# Patient Record
Sex: FEMALE | Race: BLACK OR AFRICAN AMERICAN | Employment: FULL TIME | URBAN - METROPOLITAN AREA
[De-identification: names, ages, dates, MRNs, and addresses within clinical notes are randomized per-mention and may not be internally consistent; named-entity substitution may affect disease eponyms.]

---

## 2017-04-12 ENCOUNTER — HOSPITAL ENCOUNTER (EMERGENCY)
Age: 27
Discharge: HOME | End: 2017-04-12
Attending: EMERGENCY MEDICINE
Payer: MEDICAID

## 2017-04-12 VITALS
SYSTOLIC BLOOD PRESSURE: 118 MMHG | WEIGHT: 165 LBS | TEMPERATURE: 97.9 F | HEART RATE: 100 BPM | DIASTOLIC BLOOD PRESSURE: 80 MMHG | HEIGHT: 68 IN | RESPIRATION RATE: 18 BRPM | BODY MASS INDEX: 25.01 KG/M2 | OXYGEN SATURATION: 100 %

## 2017-04-12 DIAGNOSIS — R53.83 FATIGUE, UNSPECIFIED TYPE: Primary | ICD-10-CM

## 2017-04-12 LAB
ANION GAP BLD CALC-SCNC: 19 MMOL/L (ref 5–15)
BUN BLD-MCNC: 6 MG/DL (ref 9–20)
CA-I BLD-MCNC: 1.25 MMOL/L (ref 1.12–1.32)
CHLORIDE BLD-SCNC: 104 MMOL/L (ref 98–107)
CO2 BLD-SCNC: 21 MMOL/L (ref 21–32)
CREAT BLD-MCNC: 0.7 MG/DL (ref 0.6–1.3)
GLUCOSE BLD-MCNC: 83 MG/DL (ref 65–100)
HCT VFR BLD CALC: 45 % (ref 35–47)
HGB BLD-MCNC: 15.3 GM/DL (ref 11.5–16)
POTASSIUM BLD-SCNC: 3.7 MMOL/L (ref 3.5–5.1)
SERVICE CMNT-IMP: ABNORMAL
SODIUM BLD-SCNC: 140 MMOL/L (ref 136–145)

## 2017-04-12 PROCEDURE — 99283 EMERGENCY DEPT VISIT LOW MDM: CPT

## 2017-04-12 PROCEDURE — 80047 BASIC METABLC PNL IONIZED CA: CPT

## 2017-04-12 NOTE — LETTER
[facility] EMERGENCY DEPT 
[address] [facility] [address] [id number] 
[phone] Work/School Note Date: 4/12/2017 To Whom It May concern: 
 
Asael Guadarrama was seen and treated today in the emergency room by the following provider(s): 
Attending Provider: Chintan Graham MD 
Physician Assistant: Thayer Ahumada, PA-C. Asael Guadarrama may return to work on 4/13/17. Sincerely, Thayer Ahumada, PA-C

## 2017-04-12 NOTE — DISCHARGE INSTRUCTIONS
Fatigue: Care Instructions  Your Care Instructions  Fatigue is a feeling of tiredness, exhaustion, or lack of energy. You may feel fatigue because of too much or not enough activity. It can also come from stress, lack of sleep, boredom, and poor diet. Many medical problems, such as viral infections, can cause fatigue. Emotional problems, especially depression, are often the cause of fatigue. Fatigue is most often a symptom of another problem. Treatment for fatigue depends on the cause. For example, if you have fatigue because you have a certain health problem, treating this problem also treats your fatigue. If depression or anxiety is the cause, treatment may help. Follow-up care is a key part of your treatment and safety. Be sure to make and go to all appointments, and call your doctor if you are having problems. It's also a good idea to know your test results and keep a list of the medicines you take. How can you care for yourself at home? · Get regular exercise. But don't overdo it. Go back and forth between rest and exercise. · Get plenty of rest.  · Eat a healthy diet. Do not skip meals, especially breakfast.  · Reduce your use of caffeine, tobacco, and alcohol. Caffeine is most often found in coffee, tea, cola drinks, and chocolate. · Limit medicines that can cause fatigue. This includes tranquilizers and cold and allergy medicines. When should you call for help? Watch closely for changes in your health, and be sure to contact your doctor if:  · You have new symptoms such as fever or a rash. · Your fatigue gets worse. · You have been feeling down, depressed, or hopeless. Or you may have lost interest in things that you usually enjoy. · You are not getting better as expected. Where can you learn more? Go to http://rito-freddy.info/. Enter V753 in the search box to learn more about \"Fatigue: Care Instructions. \"  Current as of: May 27, 2016  Content Version: 11.2  © 2223-5517 [employer]. Care instructions adapted under license by [employer] (which disclaims liability or warranty for this information). If you have questions about a medical condition or this instruction, always ask your healthcare professional. [place] [address] any warranty or liability for your use of this information.

## 2017-04-12 NOTE — ED NOTES
Pt c/o allergies sx and lower back pain, was seen and treated and still feeling drained. Pt unable to see PCP. Emergency Department Nursing Plan of Care       The Nursing Plan of Care is developed from the Nursing assessment and Emergency Department Attending provider initial evaluation. The plan of care may be reviewed in the ED Provider note.     The Plan of Care was developed with the following considerations:   Patient / Family readiness to learn indicated by:verbalized understanding  Persons(s) to be included in education: patient  Barriers to Learning/Limitations:No    Signed     Chitra Huerta RN    4/12/2017   9:15 AM

## 2017-04-12 NOTE — ED PROVIDER NOTES
HPI Comments: Pt states she went to PT first last week and was treated for UTI and allergies. Pt states she went to work this weekend but couldn't make it to work yesterday because she just felt \"weak\"  Pt denies any specific pain but reports she is unsure if it's the medicine she is taking or if she needs to take a \"multi vitamin\"  Pt states she was told to come back to PT first but she lived closer to this ED and she was unable to get an appt with PCP. Patient is a 27 y.o. female presenting with fatigue. The history is provided by the patient. Fatigue   This is a new problem. The current episode started 2 days ago. The problem has not changed since onset. There was no focality noted. Pertinent negatives include no slurred speech, no speech difficulty, no memory loss, no mental status change and no disorientation. There has been no fever. Pertinent negatives include no shortness of breath, no chest pain, no vomiting, no headaches and no nausea. History reviewed. No pertinent past medical history. History reviewed. No pertinent surgical history. History reviewed. No pertinent family history. Social History     Social History    Marital status: SINGLE     Spouse name: N/A    Number of children: N/A    Years of education: N/A     Occupational History    Not on file. Social History Main Topics    Smoking status: Current Every Day Smoker     Packs/day: 0.50    Smokeless tobacco: Not on file    Alcohol use Yes    Drug use: No    Sexual activity: No     Other Topics Concern    Not on file     Social History Narrative         ALLERGIES: Review of patient's allergies indicates no known allergies. Review of Systems   Constitutional: Positive for activity change and fatigue. Negative for chills and fever. Respiratory: Negative for shortness of breath. Cardiovascular: Negative for chest pain. Gastrointestinal: Negative for nausea and vomiting.    Genitourinary: Negative for dysuria and frequency. Musculoskeletal: Positive for back pain. Neurological: Negative for dizziness, speech difficulty, weakness and headaches. Psychiatric/Behavioral: Negative for memory loss. All other systems reviewed and are negative. Vitals:    04/12/17 0853   BP: 118/80   Pulse: 100   Resp: 18   Temp: 97.9 °F (36.6 °C)   SpO2: 100%   Weight: 74.8 kg (165 lb)   Height: 5' 8\" (1.727 m)            Physical Exam   Constitutional: She is oriented to person, place, and time. She appears well-developed and well-nourished. No distress. HENT:   Head: Normocephalic and atraumatic. Mouth/Throat: Oropharynx is clear and moist. No oropharyngeal exudate. Eyes: Conjunctivae are normal.   Cardiovascular: Normal rate, regular rhythm and normal heart sounds. Pulmonary/Chest: Effort normal and breath sounds normal. No respiratory distress. She has no wheezes. She has no rales. Abdominal: Soft. Bowel sounds are normal.   Musculoskeletal: Normal range of motion. Neurological: She is alert and oriented to person, place, and time. Skin: Skin is warm and dry. Psychiatric: She has a normal mood and affect. Her behavior is normal. Judgment and thought content normal.   Nursing note and vitals reviewed.        MDM  Number of Diagnoses or Management Options  Diagnosis management comments: DDX: electrolyte imbalance, anemia, viral illness       Amount and/or Complexity of Data Reviewed  Clinical lab tests: ordered and reviewed      ED Course       Procedures

## 2017-07-10 ENCOUNTER — HOSPITAL ENCOUNTER (EMERGENCY)
Age: 27
Discharge: HOME | End: 2017-07-10
Attending: INTERNAL MEDICINE
Payer: MEDICAID

## 2017-07-10 VITALS
DIASTOLIC BLOOD PRESSURE: 74 MMHG | BODY MASS INDEX: 28.49 KG/M2 | RESPIRATION RATE: 18 BRPM | SYSTOLIC BLOOD PRESSURE: 124 MMHG | TEMPERATURE: 98.4 F | HEART RATE: 101 BPM | HEIGHT: 68 IN | WEIGHT: 188 LBS | OXYGEN SATURATION: 98 %

## 2017-07-10 DIAGNOSIS — B96.89 BV (BACTERIAL VAGINOSIS): Primary | ICD-10-CM

## 2017-07-10 DIAGNOSIS — N76.0 BV (BACTERIAL VAGINOSIS): Primary | ICD-10-CM

## 2017-07-10 DIAGNOSIS — F41.8 ANXIETY ASSOCIATED WITH DEPRESSION: ICD-10-CM

## 2017-07-10 LAB
APPEARANCE UR: ABNORMAL
BACTERIA URNS QL MICRO: ABNORMAL /HPF
BILIRUB UR QL: NEGATIVE
COLOR UR: ABNORMAL
EPITH CASTS URNS QL MICRO: ABNORMAL /LPF
GLUCOSE UR STRIP.AUTO-MCNC: NEGATIVE MG/DL
HCG UR QL: NEGATIVE
HGB UR QL STRIP: NEGATIVE
KETONES UR QL STRIP.AUTO: NEGATIVE MG/DL
LEUKOCYTE ESTERASE UR QL STRIP.AUTO: ABNORMAL
NITRITE UR QL STRIP.AUTO: NEGATIVE
PH UR STRIP: 6 [PH] (ref 5–8)
PROT UR STRIP-MCNC: NEGATIVE MG/DL
RBC #/AREA URNS HPF: ABNORMAL /HPF (ref 0–5)
SP GR UR REFRACTOMETRY: 1.01 (ref 1–1.03)
UA: UC IF INDICATED,UAUC: ABNORMAL
UROBILINOGEN UR QL STRIP.AUTO: 0.2 EU/DL (ref 0.2–1)
WBC URNS QL MICRO: ABNORMAL /HPF (ref 0–4)

## 2017-07-10 PROCEDURE — 99284 EMERGENCY DEPT VISIT MOD MDM: CPT

## 2017-07-10 PROCEDURE — 81001 URINALYSIS AUTO W/SCOPE: CPT | Performed by: EMERGENCY MEDICINE

## 2017-07-10 PROCEDURE — 87086 URINE CULTURE/COLONY COUNT: CPT | Performed by: EMERGENCY MEDICINE

## 2017-07-10 PROCEDURE — 81025 URINE PREGNANCY TEST: CPT

## 2017-07-10 RX ORDER — SERTRALINE HYDROCHLORIDE 25 MG/1
25 TABLET, FILM COATED ORAL DAILY
Qty: 30 TAB | Refills: 0 | Status: SHIPPED | OUTPATIENT
Start: 2017-07-10 | End: 2021-06-04 | Stop reason: DRUGHIGH

## 2017-07-10 RX ORDER — METRONIDAZOLE 500 MG/1
500 TABLET ORAL 2 TIMES DAILY
Qty: 14 TAB | Refills: 0 | Status: SHIPPED | OUTPATIENT
Start: 2017-07-10 | End: 2017-07-17

## 2017-07-10 RX ORDER — LORAZEPAM 0.5 MG/1
0.5 TABLET ORAL
Qty: 20 TAB | Refills: 0 | Status: SHIPPED | OUTPATIENT
Start: 2017-07-10 | End: 2021-06-04

## 2017-07-10 NOTE — ED NOTES
Discharge instructions were given to the patient by Alfonso Florian RN. Patient was given 3 prescriptions and was encouraged to call or return to the ED for worsening issues or problems and was encouraged to schedule a follow up appointment for continuing care. Patient given a current medication reconciliation form and verbalized understanding of their medications and importance of discussing medications at follow-up. The patient verbalized understanding of discharge instructions and prescriptions, all questions were answered. The patient has no further concerns at this time. Patient stable at time of discharge. The patient left the Emergency Department ambulatory, with self, and in no acute distress. Patient declined wheelchair transport out of Emergency Department.

## 2017-07-10 NOTE — ED NOTES
Emergency Department Nursing Plan of Care       The Nursing Plan of Care is developed from the Nursing assessment and Emergency Department Attending provider initial evaluation. The plan of care may be reviewed in the ED Provider note.     The Plan of Care was developed with the following considerations:   Patient / Family readiness to learn indicated by:verbalized understanding  Persons(s) to be included in education: patient  Barriers to Learning/Limitations:No    Signed     Zaina Dykes RN    7/10/2017   3:21 PM

## 2017-07-10 NOTE — ED PROVIDER NOTES
HPI Comments: Yesika Quigley is a 32 y.o. female presenting ambulatory to the ED with c/o recurrent \"white\" vaginal discharge x 1 week. Pt notes associated symptom of lower abdominal pain. She states she has had similar symptoms in the past and was diagnosed with bacterial vaginosis. Pt believes her symptoms may be secondary to the soap she uses. Pt is unable to schedule an appointment with her OB/GYN until the end of the month. Pt denies fever, chills, nausea, vomiting, hx of having a STD, being sexually active or chance of being pregnant. Yesika Quigley is a 32 y.o. female with significant PMHx of anxiety, presents ambulatory to the ED with c/o anxious x 2 months. She notes that she has not been sleeping properly. Pt reports she has been very stressed because her brother passed away and they do not his cause of death. She states her  is in the process of scheduling an appointment with a psychiatrist for her. Pt notes she smokes 1 pack a day and has been drinking alcohol more frequently. Pt denies suicidal/homicidal ideations. PCP: None    There are no other complaints, changes or physical findings at this time. Written by SHAYE Nesbitt, as dictated by Suha Martines MD.      The history is provided by the patient. No  was used. History reviewed. No pertinent past medical history. History reviewed. No pertinent surgical history. History reviewed. No pertinent family history. Social History     Social History    Marital status: SINGLE     Spouse name: N/A    Number of children: N/A    Years of education: N/A     Occupational History    Not on file.      Social History Main Topics    Smoking status: Current Every Day Smoker     Packs/day: 1.00    Smokeless tobacco: Never Used    Alcohol use Yes      Comment: \"every other day\"    Drug use: Yes     Special: Marijuana      Comment: occ    Sexual activity: No     Other Topics Concern    Not on file     Social History Narrative         ALLERGIES: Review of patient's allergies indicates no known allergies. Review of Systems   Constitutional: Negative. Negative for activity change, appetite change, chills, fatigue, fever and unexpected weight change. HENT: Negative. Negative for congestion, hearing loss, rhinorrhea, sneezing and voice change. Eyes: Negative. Negative for pain and visual disturbance. Respiratory: Negative. Negative for apnea, cough, choking, chest tightness and shortness of breath. Cardiovascular: Negative. Negative for chest pain and palpitations. Gastrointestinal: Positive for abdominal pain (lower). Negative for abdominal distention, blood in stool, diarrhea, nausea and vomiting. Genitourinary: Positive for vaginal discharge (\"white\"). Negative for difficulty urinating, flank pain, frequency and urgency. No discharge   Musculoskeletal: Negative. Negative for arthralgias, back pain, myalgias and neck stiffness. Skin: Negative. Negative for color change and rash. Neurological: Negative. Negative for dizziness, seizures, syncope, speech difficulty, weakness, numbness and headaches. Hematological: Negative for adenopathy. Psychiatric/Behavioral: Positive for sleep disturbance. Negative for agitation, behavioral problems, dysphoric mood and suicidal ideas. The patient is nervous/anxious. Vitals:    07/10/17 1458 07/10/17 1635   BP: 122/70 124/74   Pulse: (!) 114 (!) 101   Resp: 18 18   Temp: 98.4 °F (36.9 °C)    SpO2: 97% 98%   Weight: 85.3 kg (188 lb)    Height: 5' 8\" (1.727 m)             Physical Exam   Constitutional: She is oriented to person, place, and time. She appears well-developed and well-nourished. HENT:   Head: Normocephalic and atraumatic. Mouth/Throat: Oropharynx is clear and moist.   Eyes: Conjunctivae and EOM are normal. Pupils are equal, round, and reactive to light. Neck: Normal range of motion. Neck supple. Cardiovascular: Normal rate, regular rhythm and normal heart sounds. Exam reveals no gallop and no friction rub. No murmur heard. Pulmonary/Chest: Effort normal and breath sounds normal. No respiratory distress. She has no wheezes. She has no rales. Abdominal: Soft. Bowel sounds are normal. She exhibits no distension. There is no tenderness. There is no rebound and no guarding. Musculoskeletal: Normal range of motion. She exhibits no edema or tenderness. Lymphadenopathy:     She has no cervical adenopathy. Neurological: She is alert and oriented to person, place, and time. She has normal strength. No cranial nerve deficit or sensory deficit. She displays a negative Romberg sign. Coordination and gait normal.   Skin: Skin is warm and dry. No ecchymosis, no lesion and no rash noted. Rash is not urticarial. She is not diaphoretic. No erythema. Psychiatric: She has a normal mood and affect. Nursing note and vitals reviewed.        MDM  Number of Diagnoses or Management Options  Diagnosis management comments:     DDx: BV, UTI, pregnancy, stress induced anxiety/depression        Amount and/or Complexity of Data Reviewed  Clinical lab tests: ordered and reviewed    Patient Progress  Patient progress: stable    ED Course       Procedures    LABORATORY TESTS:  Recent Results (from the past 12 hour(s))   URINALYSIS W/ REFLEX CULTURE    Collection Time: 07/10/17  3:38 PM   Result Value Ref Range    Color YELLOW/STRAW      Appearance CLOUDY (A) CLEAR      Specific gravity 1.010 1.003 - 1.030      pH (UA) 6.0 5.0 - 8.0      Protein NEGATIVE  NEG mg/dL    Glucose NEGATIVE  NEG mg/dL    Ketone NEGATIVE  NEG mg/dL    Bilirubin NEGATIVE  NEG      Blood NEGATIVE  NEG      Urobilinogen 0.2 0.2 - 1.0 EU/dL    Nitrites NEGATIVE  NEG      Leukocyte Esterase TRACE (A) NEG      WBC 0-4 0 - 4 /hpf    RBC 0-5 0 - 5 /hpf    Epithelial cells FEW FEW /lpf    Bacteria 1+ (A) NEG /hpf    UA:UC IF INDICATED URINE CULTURE ORDERED (A) CNI     HCG URINE, QL. - POC    Collection Time: 07/10/17  3:38 PM   Result Value Ref Range    Pregnancy test,urine (POC) NEGATIVE  NEG         IMPRESSION:  1. BV (bacterial vaginosis)    2. Anxiety associated with depression        PLAN:  1. Discharge Medication List as of 7/10/2017  4:19 PM      START taking these medications    Details   LORazepam (ATIVAN) 0.5 mg tablet Take 1 Tab by mouth every eight (8) hours as needed for Anxiety. Max Daily Amount: 1.5 mg., Print, Disp-20 Tab, R-0      sertraline (ZOLOFT) 25 mg tablet Take 1 Tab by mouth daily. , Print, Disp-30 Tab, R-0      metroNIDAZOLE (FLAGYL) 500 mg tablet Take 1 Tab by mouth two (2) times a day for 7 days. , Print, Disp-14 Tab, R-0           2. Follow-up Information     Follow up With Details Comments Contact Info    None In 3 days  None (395) Patient stated that they have no PCP      [facility] EMERGENCY DEPT   [facility]  [phone]        Return to ED if worse     DISCHARGE NOTE  4:35 PM  The patient has been re-evaluated and is ready for discharge. Reviewed available results with patient. Counseled pt on diagnosis and care plan. Pt has expressed understanding, and all questions have been answered. Pt agrees with plan and agrees to F/U as recommended, or return to the ED if their sxs worsen. Discharge instructions have been provided and explained to the pt, along with reasons to return to the ED. Written by Amara Serrato, ED Scribe, as dictated by Cassius Cogan, MD.      This note is prepared by Amara Serrato, acting as Scribe for Cassius Cogan, MD.    Cassius Cogan, MD : The scribe's documentation has been prepared under my direction and personally reviewed by me in its entirety. I confirm that the note above accurately reflects all work, treatment, procedures, and medical decision making performed by me.

## 2017-07-10 NOTE — DISCHARGE INSTRUCTIONS
Anxiety Disorder: Care Instructions  Your Care Instructions  Anxiety is a normal reaction to stress. Difficult situations can cause you to have symptoms such as sweaty palms and a nervous feeling. In an anxiety disorder, the symptoms are far more severe. Constant worry, muscle tension, trouble sleeping, nausea and diarrhea, and other symptoms can make normal daily activities difficult or impossible. These symptoms may occur for no reason, and they can affect your work, school, or social life. Medicines, counseling, and self-care can all help. Follow-up care is a key part of your treatment and safety. Be sure to make and go to all appointments, and call your doctor if you are having problems. It's also a good idea to know your test results and keep a list of the medicines you take. How can you care for yourself at home? · Take medicines exactly as directed. Call your doctor if you think you are having a problem with your medicine. · Go to your counseling sessions and follow-up appointments. · Recognize and accept your anxiety. Then, when you are in a situation that makes you anxious, say to yourself, \"This is not an emergency. I feel uncomfortable, but I am not in danger. I can keep going even if I feel anxious. \"  · Be kind to your body:  ¨ Relieve tension with exercise or a massage. ¨ Get enough rest.  ¨ Avoid alcohol, caffeine, nicotine, and illegal drugs. They can increase your anxiety level and cause sleep problems. ¨ Learn and do relaxation techniques. See below for more about these techniques. · Engage your mind. Get out and do something you enjoy. Go to a funny movie, or take a walk or hike. Plan your day. Having too much or too little to do can make you anxious. · Keep a record of your symptoms. Discuss your fears with a good friend or family member, or join a support group for people with similar problems. Talking to others sometimes relieves stress.   · Get involved in social groups, or volunteer to help others. Being alone sometimes makes things seem worse than they are. · Get at least 30 minutes of exercise on most days of the week to relieve stress. Walking is a good choice. You also may want to do other activities, such as running, swimming, cycling, or playing tennis or team sports. Relaxation techniques  Do relaxation exercises 10 to 20 minutes a day. You can play soothing, relaxing music while you do them, if you wish. · Tell others in your house that you are going to do your relaxation exercises. Ask them not to disturb you. · Find a comfortable place, away from all distractions and noise. · Lie down on your back, or sit with your back straight. · Focus on your breathing. Make it slow and steady. · Breathe in through your nose. Breathe out through either your nose or mouth. · Breathe deeply, filling up the area between your navel and your rib cage. Breathe so that your belly goes up and down. · Do not hold your breath. · Breathe like this for 5 to 10 minutes. Notice the feeling of calmness throughout your whole body. As you continue to breathe slowly and deeply, relax by doing the following for another 5 to 10 minutes:  · Tighten and relax each muscle group in your body. You can begin at your toes and work your way up to your head. · Imagine your muscle groups relaxing and becoming heavy. · Empty your mind of all thoughts. · Let yourself relax more and more deeply. · Become aware of the state of calmness that surrounds you. · When your relaxation time is over, you can bring yourself back to alertness by moving your fingers and toes and then your hands and feet and then stretching and moving your entire body. Sometimes people fall asleep during relaxation, but they usually wake up shortly afterward. · Always give yourself time to return to full alertness before you drive a car or do anything that might cause an accident if you are not fully alert.  Never play a relaxation tape while you drive a car. When should you call for help? Call 911 anytime you think you may need emergency care. For example, call if:  · You feel you cannot stop from hurting yourself or someone else. Keep the numbers for these national suicide hotlines: 9-820-286-TALK (2-511.717.9940) and 8-336-AJMCLOF (8-523.264.2349). If you or someone you know talks about suicide or feeling hopeless, get help right away. Watch closely for changes in your health, and be sure to contact your doctor if:  · You have anxiety or fear that affects your life. · You have symptoms of anxiety that are new or different from those you had before. Where can you learn more? Go to http://ritoFonfreddy.info/. Enter P754 in the search box to learn more about \"Anxiety Disorder: Care Instructions. \"  Current as of: July 26, 2016  Content Version: 11.3  © 4393-8556 [employer]. Care instructions adapted under license by [employer] (which disclaims liability or warranty for this information). If you have questions about a medical condition or this instruction, always ask your healthcare professional. [address] any warranty or liability for your use of this information. Bacterial Vaginosis: Care Instructions  Your Care Instructions    Bacterial vaginosis is a type of vaginal infection. It is caused by excess growth of certain bacteria that are normally found in the vagina. Symptoms can include itching, swelling, pain when you urinate or have sex, and a gray or yellow discharge with a \"fishy\" odor. It is not considered an infection that is spread through sexual contact. Although symptoms can be annoying and uncomfortable, bacterial vaginosis does not usually cause other health problems. However, if you have it while you are pregnant, it can cause complications. While the infection may go away on its own, most doctors use antibiotics to treat it.  You may have been prescribed pills or vaginal cream. With treatment, bacterial vaginosis usually clears up in 5 to 7 days. Follow-up care is a key part of your treatment and safety. Be sure to make and go to all appointments, and call your doctor if you are having problems. It's also a good idea to know your test results and keep a list of the medicines you take. How can you care for yourself at home? · Take your antibiotics as directed. Do not stop taking them just because you feel better. You need to take the full course of antibiotics. · Do not eat or drink anything that contains alcohol if you are taking metronidazole (Flagyl). · Keep using your medicine if you start your period. Use pads instead of tampons while using a vaginal cream or suppository. Tampons can absorb the medicine. · Wear loose cotton clothing. Do not wear nylon and other materials that hold body heat and moisture close to the skin. · Do not scratch. Relieve itching with a cold pack or a cool bath. · Do not wash your vaginal area more than once a day. Use plain water or a mild, unscented soap. Do not douche. When should you call for help? Watch closely for changes in your health, and be sure to contact your doctor if:  · You have unexpected vaginal bleeding. · You have a fever. · You have new or increased pain in your vagina or pelvis. · You are not getting better after 1 week. · Your symptoms return after you finish the course of your medicine. Where can you learn more? Go to http://rito-freddy.info/. Samantha Diaz in the search box to learn more about \"Bacterial Vaginosis: Care Instructions. \"  Current as of: October 13, 2016  Content Version: 11.3  © 8736-7545 [employer]. Care instructions adapted under license by [employer] (which disclaims liability or warranty for this information).  If you have questions about a medical condition or this instruction, always ask your healthcare professional. [employer], [facility] disclaims any warranty or liability for your use of this information. Adjustment Disorder: Care Instructions  Your Care Instructions  Adjustment disorder means that you have emotional or behavioral problems because of stress. But your response to the stress is far more severe than a normal response. It is severe enough to affect your work or social life and may cause depression and physical pains and problems. Events that may cause this response can include a divorce, money problems, or starting school or a new job. It might be anything that causes some stress. This disorder is most often a short-term problem. It happens within 3 months of the stressful event or change. If the response lasts longer than 6 months after the event ends, you may have a more serious disorder. Follow-up care is a key part of your treatment and safety. Be sure to make and go to all appointments, and call your doctor if you are having problems. It's also a good idea to know your test results and keep a list of the medicines you take. How can you care for yourself at home? · Go to all counseling sessions. Do not skip any because you are feeling better. · If your doctor prescribed medicines, take them exactly as prescribed. Call your doctor if you think you are having a problem with your medicine. You will get more details on the specific medicines your doctor prescribes. · Discuss the causes of your stress with a good friend or family member. Or you can join a support group for people with similar problems. Talking to others sometimes relieves stress. · Get at least 30 minutes of exercise on most days of the week. Walking is a good choice. You also may want to do other activities, such as running, swimming, cycling, or playing tennis or team sports. Relaxation techniques  Do relaxation exercises 10 to 20 minutes a day. You can play soothing, relaxing music while you do them, if you wish.   · Tell others in your house that you are going to do your relaxation exercises. Ask them not to disturb you. · Find a comfortable, quiet place. · Lie down on your back, or sit with your back straight. · Focus on your breathing. Make it slow and steady. · Breathe in through your nose. Breathe out through either your nose or mouth. · Breathe deeply, filling up the area between your navel and your rib cage. Breathe so that your belly goes up and down. · Do not hold your breath. · Breathe like this for 5 to 10 minutes. Notice the feeling of calmness throughout your whole body. As you continue to breathe slowly and deeply, relax by doing these next steps for another 5 to 10 minutes:  · Tighten and relax each muscle group in your body. Start at your toes, and work your way up to your head. · Imagine your muscle groups relaxing and getting heavy. · Empty your mind of all thoughts. · Let yourself relax more and more deeply. · Be aware of the state of calmness that surrounds you. · When your relaxation time is over, you can bring yourself back to alertness by moving your fingers and toes. Then move your hands and feet. And then move your entire body. Sometimes people fall asleep during relaxation. But they most often wake up soon. · Always give yourself time to return to full alertness before you drive a car. Wait to do anything that might cause an accident if you are not fully alert. Never play a relaxation tape while you drive a car. When should you call for help? Call 911 anytime you think you may need emergency care. For example, call if:  · You feel you cannot stop from hurting yourself or someone else. Keep the numbers for these national suicide hotlines: 1-994-367-TALK (3-485.356.6387) and 9-718-GSKOQLZ (4-525.146.2740). If you or someone you know talks about suicide or feeling hopeless, get help right away.   Watch closely for changes in your health, and be sure to contact your doctor if:  · You have new anxiety, or your anxiety gets worse. · You have been feeling sad, depressed, or hopeless or have lost interest in things that you usually enjoy. · You do not get better as expected. Where can you learn more? Go to http://rito-freddy.info/. Enter 0688 [id number] 05 [id number] in the search box to learn more about \"Adjustment Disorder: Care Instructions. \"  Current as of: July 26, 2016  Content Version: 11.3  © 9739-1771 [employer], [employer]. Care instructions adapted under license by [employer] (which disclaims liability or warranty for this information). If you have questions about a medical condition or this instruction, always ask your healthcare professional. [place] [address] any warranty or liability for your use of this information.

## 2017-07-10 NOTE — LETTER
[facility] EMERGENCY DEPT 
[address] [facility] [address] [id number] 
[phone] Work/School Note Date: 7/10/2017 To Whom It May concern: 
 
Della Soto was seen and treated today in the emergency room by the following provider(s): 
Attending Provider: John Gan MD. Della Soto may return to work on 7/12/17. Sincerely, Heather Parson RN

## 2017-07-12 LAB
BACTERIA SPEC CULT: ABNORMAL
CC UR VC: ABNORMAL
SERVICE CMNT-IMP: ABNORMAL

## 2017-07-12 NOTE — PROGRESS NOTES
Care manager interviewed patient at bedside. The patient is a 32year old female presenting to ED due to vaginal discharge. She disclosed to provider that she has also been struggling with anxiety x 2 months after her brother passed away. Denies SI/HI. She has [employer] and states that she has a  who is helping her to obtain psychiatry follow up. CM reviewed additional resources with patient, including [facility] for free counseling sessions while waiting for psychiatry appointment. Patient provided with listing of mental health resources to take home. Patient receptive to referral information, does not have additional questions at this time. Care Management Interventions  PCP Verified by CM: Yes ([facility])  Mode of Transport at Discharge: Self  Transition of [address] (CM Consult): Discharge [address]:  Other  Confirm Follow Up Transport: Self  Plan discussed with Pt/Family/Caregiver: Yes (mental health follow up)  Discharge Location  Discharge Placement: Home with outpatient services    [facility] [place]  [phone]

## 2017-08-27 ENCOUNTER — HOSPITAL ENCOUNTER (EMERGENCY)
Age: 27
Discharge: HOME | End: 2017-08-27
Attending: EMERGENCY MEDICINE
Payer: MEDICAID

## 2017-08-27 VITALS
RESPIRATION RATE: 16 BRPM | WEIGHT: 160 LBS | DIASTOLIC BLOOD PRESSURE: 76 MMHG | OXYGEN SATURATION: 98 % | BODY MASS INDEX: 24.25 KG/M2 | HEIGHT: 68 IN | SYSTOLIC BLOOD PRESSURE: 116 MMHG | TEMPERATURE: 97.7 F | HEART RATE: 99 BPM

## 2017-08-27 DIAGNOSIS — R51.9 ACUTE NONINTRACTABLE HEADACHE, UNSPECIFIED HEADACHE TYPE: Primary | ICD-10-CM

## 2017-08-27 PROCEDURE — 99282 EMERGENCY DEPT VISIT SF MDM: CPT

## 2017-08-27 RX ORDER — BUTALBITAL, ACETAMINOPHEN AND CAFFEINE 300; 40; 50 MG/1; MG/1; MG/1
1 CAPSULE ORAL
Qty: 6 CAP | Refills: 0 | Status: SHIPPED | OUTPATIENT
Start: 2017-08-27 | End: 2020-07-20 | Stop reason: ALTCHOICE

## 2017-08-27 NOTE — ED NOTES
Pt accepted plan of care and med's. Pt decline W/C for D/C. Pt left unit steady gait. Patient (s)  given copy of dc instructions and 1 script(s). Patient (s)  verbalized understanding of instructions and script (s). Patient given a current medication reconciliation form and verbalized understanding of their medications. Patient (s) verbalized understanding of the importance of discussing medications with  his or her physician or clinic they will be following up with. Patient alert and oriented and in no acute distress. Patient discharged home ambulatory with self.

## 2017-08-27 NOTE — LETTER
[facility] EMERGENCY DEPT 
[address] [place] [address] 
[phone] Work/School Note Date: 8/27/2017 To Whom It May concern: 
 
Jennie Medina was seen and treated today in the emergency room by the following provider(s): 
Attending Provider: Sudha Love MD.   
 
 
 
Sincerely, Sudha Love MD

## 2017-08-27 NOTE — ED NOTES
Pt c/o HA bilateral temples x 1 week. OTC med's not effective. Emergency Department Nursing Plan of Care       The Nursing Plan of Care is developed from the Nursing assessment and Emergency Department Attending provider initial evaluation. The plan of care may be reviewed in the ED Provider note.     The Plan of Care was developed with the following considerations:   Patient / Family readiness to learn indicated by:verbalized understanding  Persons(s) to be included in education: patient  Barriers to Learning/Limitations:No    Signed     Segun BRENDON Rodriguez    8/27/2017   2:58 PM

## 2017-08-27 NOTE — DISCHARGE INSTRUCTIONS

## 2017-08-27 NOTE — ED PROVIDER NOTES
Patient is a 32 y.o. female presenting with headaches. The history is provided by the patient. No  was used. Headache    This is a new problem. The current episode started 6 to 12 hours ago. The problem has not changed since onset. Associated with: \"my allergies\" The pain is located in the frontal region. The quality of the pain is described as dull. The pain is mild. Pertinent negatives include no fever, no chest pressure, no orthopnea, no palpitations, no syncope, no shortness of breath, no dizziness, no visual change, no nausea and no vomiting. Treatments tried: benadryl. The treatment provided no relief. Pt presents with frontal headache that caused her to leave work. Would like \"something for her headache and a work note\". Does not want to give urine or any other workup. No past medical history on file. No past surgical history on file. No family history on file. Social History     Social History    Marital status: SINGLE     Spouse name: N/A    Number of children: N/A    Years of education: N/A     Occupational History    Not on file. Social History Main Topics    Smoking status: Current Every Day Smoker     Packs/day: 1.00    Smokeless tobacco: Never Used    Alcohol use Yes      Comment: \"every other day\"    Drug use: Yes     Special: Marijuana      Comment: occ    Sexual activity: No     Other Topics Concern    Not on file     Social History Narrative         ALLERGIES: Review of patient's allergies indicates no known allergies. Review of Systems   Constitutional: Negative for fever. Respiratory: Negative for shortness of breath. Cardiovascular: Negative for palpitations, orthopnea and syncope. Gastrointestinal: Negative for nausea and vomiting. Neurological: Positive for headaches. Negative for dizziness. All other systems reviewed and are negative.       Vitals:    08/27/17 1411   BP: 116/76   Pulse: 99   Resp: 16   Temp: 97.7 °F (36.5 °C) SpO2: 98%   Weight: 72.6 kg (160 lb)   Height: 5' 8\" (1.727 m)            Physical Exam   Constitutional: She is oriented to person, place, and time. She appears well-developed and well-nourished. No distress. Nontoxic appearing [de-identified] female   HENT:   Head: Normocephalic and atraumatic. Right Ear: External ear normal.   Left Ear: External ear normal.   Nose: Nose normal.   Mouth/Throat: Oropharynx is clear and moist. No oropharyngeal exudate. Eyes: EOM are normal. Pupils are equal, round, and reactive to light. Right eye exhibits no discharge. Left eye exhibits no discharge. No scleral icterus. Sl conj injection   Neck: Normal range of motion. Neck supple. No JVD present. No tracheal deviation present. No thyromegaly present. Cardiovascular: Normal rate, regular rhythm and normal heart sounds. No murmur heard. Pulmonary/Chest: Effort normal and breath sounds normal. No stridor. No respiratory distress. She has no wheezes. She has no rales. Abdominal: Soft. She exhibits no distension. There is no tenderness. Musculoskeletal: Normal range of motion. She exhibits no edema or tenderness. Lymphadenopathy:     She has no cervical adenopathy. Neurological: She is alert and oriented to person, place, and time. Skin: Skin is warm and dry. No rash noted. She is not diaphoretic. No erythema. No pallor. Psychiatric: She has a normal mood and affect. Her behavior is normal.   Nursing note and vitals reviewed.        [facility]  ED Course       Procedures

## 2018-02-02 ENCOUNTER — HOSPITAL ENCOUNTER (EMERGENCY)
Age: 28
Discharge: HOME | End: 2018-02-02
Attending: EMERGENCY MEDICINE
Payer: MEDICAID

## 2018-02-02 VITALS
HEIGHT: 68 IN | TEMPERATURE: 98 F | BODY MASS INDEX: 24.25 KG/M2 | HEART RATE: 117 BPM | DIASTOLIC BLOOD PRESSURE: 88 MMHG | OXYGEN SATURATION: 100 % | WEIGHT: 160 LBS | RESPIRATION RATE: 16 BRPM | SYSTOLIC BLOOD PRESSURE: 132 MMHG

## 2018-02-02 DIAGNOSIS — N76.0 BV (BACTERIAL VAGINOSIS): ICD-10-CM

## 2018-02-02 DIAGNOSIS — B96.89 BV (BACTERIAL VAGINOSIS): ICD-10-CM

## 2018-02-02 DIAGNOSIS — N30.00 ACUTE CYSTITIS WITHOUT HEMATURIA: Primary | ICD-10-CM

## 2018-02-02 LAB
APPEARANCE UR: ABNORMAL
BACTERIA URNS QL MICRO: ABNORMAL /HPF
BILIRUB UR QL: NEGATIVE
CAOX CRY URNS QL MICRO: ABNORMAL
CLUE CELLS VAG QL WET PREP: NORMAL
COLOR UR: ABNORMAL
EPITH CASTS URNS QL MICRO: ABNORMAL /LPF
GLUCOSE UR STRIP.AUTO-MCNC: NEGATIVE MG/DL
HCG UR QL: NEGATIVE
HCG UR QL: NEGATIVE
HGB UR QL STRIP: NEGATIVE
KETONES UR QL STRIP.AUTO: NEGATIVE MG/DL
KOH PREP SPEC: NORMAL
LEUKOCYTE ESTERASE UR QL STRIP.AUTO: ABNORMAL
MUCOUS THREADS URNS QL MICRO: ABNORMAL /LPF
NITRITE UR QL STRIP.AUTO: NEGATIVE
PH UR STRIP: 6 [PH] (ref 5–8)
PROT UR STRIP-MCNC: NEGATIVE MG/DL
RBC #/AREA URNS HPF: ABNORMAL /HPF (ref 0–5)
SERVICE CMNT-IMP: NORMAL
SP GR UR REFRACTOMETRY: 1.02 (ref 1–1.03)
T VAGINALIS VAG QL WET PREP: NORMAL
UA: UC IF INDICATED,UAUC: ABNORMAL
UROBILINOGEN UR QL STRIP.AUTO: 1 EU/DL (ref 0.2–1)
WBC URNS QL MICRO: ABNORMAL /HPF (ref 0–4)

## 2018-02-02 PROCEDURE — 74011250637 HC RX REV CODE- 250/637: Performed by: PHYSICIAN ASSISTANT

## 2018-02-02 PROCEDURE — 99284 EMERGENCY DEPT VISIT MOD MDM: CPT

## 2018-02-02 PROCEDURE — 81001 URINALYSIS AUTO W/SCOPE: CPT | Performed by: EMERGENCY MEDICINE

## 2018-02-02 PROCEDURE — 96372 THER/PROPH/DIAG INJ SC/IM: CPT

## 2018-02-02 PROCEDURE — 87491 CHLMYD TRACH DNA AMP PROBE: CPT | Performed by: PHYSICIAN ASSISTANT

## 2018-02-02 PROCEDURE — 87086 URINE CULTURE/COLONY COUNT: CPT | Performed by: EMERGENCY MEDICINE

## 2018-02-02 PROCEDURE — 87210 SMEAR WET MOUNT SALINE/INK: CPT | Performed by: PHYSICIAN ASSISTANT

## 2018-02-02 PROCEDURE — 81025 URINE PREGNANCY TEST: CPT

## 2018-02-02 PROCEDURE — 74011250636 HC RX REV CODE- 250/636: Performed by: PHYSICIAN ASSISTANT

## 2018-02-02 PROCEDURE — 74011000250 HC RX REV CODE- 250: Performed by: PHYSICIAN ASSISTANT

## 2018-02-02 RX ORDER — METRONIDAZOLE 500 MG/1
500 TABLET ORAL 2 TIMES DAILY
Qty: 14 TAB | Refills: 0 | Status: SHIPPED | OUTPATIENT
Start: 2018-02-02 | End: 2018-02-09

## 2018-02-02 RX ORDER — AZITHROMYCIN 250 MG/1
1000 TABLET, FILM COATED ORAL
Status: COMPLETED | OUTPATIENT
Start: 2018-02-02 | End: 2018-02-02

## 2018-02-02 RX ORDER — CEPHALEXIN 500 MG/1
500 CAPSULE ORAL 2 TIMES DAILY
Qty: 14 CAP | Refills: 0 | Status: SHIPPED | OUTPATIENT
Start: 2018-02-02 | End: 2018-02-09

## 2018-02-02 RX ADMIN — AZITHROMYCIN 1000 MG: 250 TABLET, FILM COATED ORAL at 21:44

## 2018-02-02 RX ADMIN — LIDOCAINE HYDROCHLORIDE 250 MG: 10 INJECTION, SOLUTION EPIDURAL; INFILTRATION; INTRACAUDAL; PERINEURAL at 21:44

## 2018-02-03 NOTE — ED NOTES
Pt medicated as per provider orders and accepted Dc data and med's. Patient (s)  given copy of dc instructions and 3 script(s). Patient (s)  verbalized understanding of instructions and script (s). Patient given a current medication reconciliation form and verbalized understanding of their medications. Patient (s) verbalized understanding of the importance of discussing medications with  his or her physician or clinic they will be following up with. Patient alert and oriented and in no acute distress. Patient discharged home ambulatory with self.

## 2018-02-03 NOTE — DISCHARGE INSTRUCTIONS
Bacterial Vaginosis: Care Instructions  Your Care Instructions    Bacterial vaginosis is a type of vaginal infection. It is caused by excess growth of certain bacteria that are normally found in the vagina. Symptoms can include itching, swelling, pain when you urinate or have sex, and a gray or yellow discharge with a \"fishy\" odor. It is not considered an infection that is spread through sexual contact. Although symptoms can be annoying and uncomfortable, bacterial vaginosis does not usually cause other health problems. However, if you have it while you are pregnant, it can cause complications. While the infection may go away on its own, most doctors use antibiotics to treat it. You may have been prescribed pills or vaginal cream. With treatment, bacterial vaginosis usually clears up in 5 to 7 days. Follow-up care is a key part of your treatment and safety. Be sure to make and go to all appointments, and call your doctor if you are having problems. It's also a good idea to know your test results and keep a list of the medicines you take. How can you care for yourself at home? · Take your antibiotics as directed. Do not stop taking them just because you feel better. You need to take the full course of antibiotics. · Do not eat or drink anything that contains alcohol if you are taking metronidazole (Flagyl). · Keep using your medicine if you start your period. Use pads instead of tampons while using a vaginal cream or suppository. Tampons can absorb the medicine. · Wear loose cotton clothing. Do not wear nylon and other materials that hold body heat and moisture close to the skin. · Do not scratch. Relieve itching with a cold pack or a cool bath. · Do not wash your vaginal area more than once a day. Use plain water or a mild, unscented soap. Do not douche. When should you call for help?   Watch closely for changes in your health, and be sure to contact your doctor if:  ? · You have unexpected vaginal bleeding. ? · You have a fever. ? · You have new or increased pain in your vagina or pelvis. ? · You are not getting better after 1 week. ? · Your symptoms return after you finish the course of your medicine. Where can you learn more? Go to http://rito-freddy.info/. Garfield Ortega in the search box to learn more about \"Bacterial Vaginosis: Care Instructions. \"  Current as of: October 13, 2016  Content Version: 11.4  © 5261-9322 [employer]. Care instructions adapted under license by [employer] (which disclaims liability or warranty for this information). If you have questions about a medical condition or this instruction, always ask your healthcare professional. [place] [address] any warranty or liability for your use of this information. Urinary Tract Infection in Women: Care Instructions  Your Care Instructions    A urinary tract infection, or UTI, is a general term for an infection anywhere between the kidneys and the urethra (where urine comes out). Most UTIs are bladder infections. They often cause pain or burning when you urinate. UTIs are caused by bacteria and can be cured with antibiotics. Be sure to complete your treatment so that the infection goes away. Follow-up care is a key part of your treatment and safety. Be sure to make and go to all appointments, and call your doctor if you are having problems. It's also a good idea to know your test results and keep a list of the medicines you take. How can you care for yourself at home? · Take your antibiotics as directed. Do not stop taking them just because you feel better. You need to take the full course of antibiotics. · Drink extra water and other fluids for the next day or two. This may help wash out the bacteria that are causing the infection.  (If you have kidney, heart, or liver disease and have to limit fluids, talk with your doctor before you increase your fluid intake.)  · Avoid drinks that are carbonated or have caffeine. They can irritate the bladder. · Urinate often. Try to empty your bladder each time. · To relieve pain, take a hot bath or lay a heating pad set on low over your lower belly or genital area. Never go to sleep with a heating pad in place. To prevent UTIs  · Drink plenty of water each day. This helps you urinate often, which clears bacteria from your system. (If you have kidney, heart, or liver disease and have to limit fluids, talk with your doctor before you increase your fluid intake.)  · Urinate when you need to. · Urinate right after you have sex. · Change sanitary pads often. · Avoid douches, bubble baths, feminine hygiene sprays, and other feminine hygiene products that have deodorants. · After going to the bathroom, wipe from front to back. When should you call for help? Call your doctor now or seek immediate medical care if:  ? · Symptoms such as fever, chills, nausea, or vomiting get worse or appear for the first time. ? · You have new pain in your back just below your rib cage. This is called flank pain. ? · There is new blood or pus in your urine. ? · You have any problems with your antibiotic medicine. ? Watch closely for changes in your health, and be sure to contact your doctor if:  ? · You are not getting better after taking an antibiotic for 2 days. ? · Your symptoms go away but then come back. Where can you learn more? Go to http://rito-freddy.info/. Enter S874 in the search box to learn more about \"Urinary Tract Infection in Women: Care Instructions. \"  Current as of: May 12, 2017  Content Version: 11.4  © 8030-2782 [employer]. Care instructions adapted under license by [employer] (which disclaims liability or warranty for this information).  If you have questions about a medical condition or this instruction, always ask your healthcare professional. [employer] disclaims any warranty or liability for your use of this information.

## 2018-02-03 NOTE — ED NOTES
Pt here for vaginal  Discharge and abd pain. Emergency Department Nursing Plan of Care       The Nursing Plan of Care is developed from the Nursing assessment and Emergency Department Attending provider initial evaluation. The plan of care may be reviewed in the ED Provider note.     The Plan of Care was developed with the following considerations:   Patient / Family readiness to learn indicated by:verbalized understanding  Persons(s) to be included in education: patient  Barriers to Learning/Limitations:No    Signed     Jerri Shabzaz RN    2/2/2018   9:59 PM

## 2018-02-03 NOTE — ED PROVIDER NOTES
EMERGENCY DEPARTMENT HISTORY AND PHYSICAL EXAM    Date: 2/2/2018  Patient Name: Sanjiv Ferro    History of Presenting Illness     Chief Complaint   Patient presents with    Abdominal Pain         History Provided By: Patient    HPI: Sanjiv Ferro is a 32 y.o. female with No significant past medical history who presents with white vaginal discharge x 3 days. Pt reports hx of unprotected sex and concern for STD. Pt denies abd pain, N/V, dysuria or urinary frequency. Pain 9/10. LMP 1/11/18    PCP: None    Current Facility-Administered Medications   Medication Dose Route Frequency Provider Last Rate Last Dose    azithromycin (ZITHROMAX) tablet 1,000 mg  1,000 mg Oral NOW Etienne Mckee PA-C        cefTRIAXone (ROCEPHIN) 250 mg in lidocaine (PF) (XYLOCAINE) 10 mg/mL (1 %) IM injection  250 mg IntraMUSCular NOW Tresa Abrams PA-C         Current Outpatient Prescriptions   Medication Sig Dispense Refill    metroNIDAZOLE (FLAGYL) 500 mg tablet Take 1 Tab by mouth two (2) times a day for 7 days. 14 Tab 0    cephALEXin (KEFLEX) 500 mg capsule Take 1 Cap by mouth two (2) times a day for 7 days. 14 Cap 0    butalbital-acetaminophen-caff (FIORICET) -40 mg per capsule Take 1 Cap by mouth every six (6) hours as needed for Headache. Max Daily Amount: 4 Caps. 6 Cap 0    LORazepam (ATIVAN) 0.5 mg tablet Take 1 Tab by mouth every eight (8) hours as needed for Anxiety. Max Daily Amount: 1.5 mg. 20 Tab 0    sertraline (ZOLOFT) 25 mg tablet Take 1 Tab by mouth daily. 30 Tab 0       Past History     Past Medical History:  History reviewed. No pertinent past medical history. Past Surgical History:  History reviewed. No pertinent surgical history. Family History:  History reviewed. No pertinent family history.     Social History:  Social History   Substance Use Topics    Smoking status: Current Every Day Smoker     Packs/day: 1.00    Smokeless tobacco: Never Used    Alcohol use Yes      Comment: [facility] other day\"       Allergies:  No Known Allergies      Review of Systems   Review of Systems   Gastrointestinal: Negative for abdominal pain, nausea and vomiting. Genitourinary: Positive for vaginal discharge. Negative for difficulty urinating and dysuria. Neurological: Negative for speech difficulty and weakness. All other systems reviewed and are negative. Physical Exam     Vitals:    02/02/18 1841   BP: 132/88   Pulse: (!) 117   Resp: 16   Temp: 98 °F (36.7 °C)   SpO2: 100%   Weight: 72.6 kg (160 lb)   Height: 5' 8\" (1.727 m)     Physical Exam   Constitutional: She is oriented to person, place, and time. She appears well-developed and well-nourished. No distress. HENT:   Head: Normocephalic and atraumatic. Eyes: Conjunctivae are normal.   Cardiovascular: Normal rate, regular rhythm and normal heart sounds. Pulmonary/Chest: Effort normal and breath sounds normal. No respiratory distress. She has no wheezes. She has no rales. Abdominal: Soft. Bowel sounds are normal. She exhibits no distension. There is no tenderness. There is no rebound and no guarding. Genitourinary: Cervix exhibits discharge. Cervix exhibits no motion tenderness. Right adnexum displays no tenderness. Left adnexum displays tenderness. Vaginal discharge (moderate amount of whitish discharge present) found. Neurological: She is alert and oriented to person, place, and time. Skin: Skin is warm and dry. Psychiatric: She has a normal mood and affect. Her behavior is normal. Judgment and thought content normal.   Nursing note and vitals reviewed.   at 9:38 PM      Diagnostic Study Results     Labs -     Recent Results (from the past 12 hour(s))   HCG URINE, QL. - POC    Collection Time: 02/02/18  7:39 PM   Result Value Ref Range    Pregnancy test,urine (POC) NEGATIVE  NEG     URINALYSIS W/ REFLEX CULTURE    Collection Time: 02/02/18  8:10 PM   Result Value Ref Range    Color YELLOW/STRAW      Appearance CLOUDY (A) CLEAR Specific gravity 1.025 1.003 - 1.030      pH (UA) 6.0 5.0 - 8.0      Protein NEGATIVE  NEG mg/dL    Glucose NEGATIVE  NEG mg/dL    Ketone NEGATIVE  NEG mg/dL    Bilirubin NEGATIVE  NEG      Blood NEGATIVE  NEG      Urobilinogen 1.0 0.2 - 1.0 EU/dL    Nitrites NEGATIVE  NEG      Leukocyte Esterase TRACE (A) NEG      WBC 0-4 0 - 4 /hpf    RBC 0-5 0 - 5 /hpf    Epithelial cells MODERATE (A) FEW /lpf    Bacteria 3+ (A) NEG /hpf    UA:UC IF INDICATED URINE CULTURE ORDERED (A) CNI      Mucus 1+ (A) NEG /lpf    CA Oxalate crystals 2+ (A) NEG   KOH, OTHER SOURCES    Collection Time: 02/02/18  8:10 PM   Result Value Ref Range    Special Requests: NO SPECIAL REQUESTS      KOH NO YEAST SEEN     WET PREP    Collection Time: 02/02/18  8:10 PM   Result Value Ref Range    Clue cells CLUE CELLS PRESENT      Wet prep NO TRICHOMONAS SEEN     HCG URINE, QL. - POC    Collection Time: 02/02/18  8:11 PM   Result Value Ref Range    Pregnancy test,urine (POC) NEGATIVE  NEG         Radiologic Studies -   No orders to display     CT Results  (Last 48 hours)    None        CXR Results  (Last 48 hours)    None            Medical Decision Making   I am the first provider for this patient. I reviewed the vital signs, available nursing notes, past medical history, past surgical history, family history and social history. Vital Signs-Reviewed the patient's vital signs. Records Reviewed: Old Medical Records    Disposition:  Discharged    DISCHARGE NOTE:   9:38 PM      Care plan outlined and precautions discussed. Patient has no new complaints, changes, or physical findings. Results of labs were reviewed with the patient. All medications were reviewed with the patient; will d/c home. All of pt's questions and concerns were addressed. Patient was instructed and agrees to follow up with PCP, as well as to return to the ED upon further deterioration. Patient is ready to go home.     Follow-up Information     Follow up With Details Comments Contact Info    Lissette Hussein MD Schedule an appointment as soon as possible for a visit in 1 week As needed [address] an appointment as soon as possible for a visit in 1 week As needed [address] [place]   [phone]          Current Discharge Medication List      START taking these medications    Details   metroNIDAZOLE (FLAGYL) 500 mg tablet Take 1 Tab by mouth two (2) times a day for 7 days. Qty: 14 Tab, Refills: 0      cephALEXin (KEFLEX) 500 mg capsule Take 1 Cap by mouth two (2) times a day for 7 days. Qty: 14 Cap, Refills: 0         CONTINUE these medications which have NOT CHANGED    Details   butalbital-acetaminophen-caff (FIORICET) -40 mg per capsule Take 1 Cap by mouth every six (6) hours as needed for Headache. Max Daily Amount: 4 Caps. Qty: 6 Cap, Refills: 0      LORazepam (ATIVAN) 0.5 mg tablet Take 1 Tab by mouth every eight (8) hours as needed for Anxiety. Max Daily Amount: 1.5 mg.  Qty: 20 Tab, Refills: 0      sertraline (ZOLOFT) 25 mg tablet Take 1 Tab by mouth daily. Qty: 30 Tab, Refills: 0             Provider Notes (Medical Decision Making):   DDX: UTI, vaginitis, yeast, BV, STI    Procedures        Diagnosis     Clinical Impression:   1. Acute cystitis without hematuria    2.  BV (bacterial vaginosis)

## 2018-02-04 LAB
BACTERIA SPEC CULT: NORMAL
CC UR VC: NORMAL
SERVICE CMNT-IMP: NORMAL

## 2018-02-05 LAB
C TRACH DNA SPEC QL NAA+PROBE: NEGATIVE
N GONORRHOEA DNA SPEC QL NAA+PROBE: NEGATIVE
SAMPLE TYPE: NORMAL
SERVICE CMNT-IMP: NORMAL
SPECIMEN SOURCE: NORMAL

## 2018-04-05 ENCOUNTER — HOSPITAL ENCOUNTER (EMERGENCY)
Age: 28
Discharge: HOME | End: 2018-04-05
Attending: EMERGENCY MEDICINE
Payer: MEDICAID

## 2018-04-05 VITALS
OXYGEN SATURATION: 100 % | BODY MASS INDEX: 26.37 KG/M2 | HEART RATE: 98 BPM | WEIGHT: 168 LBS | TEMPERATURE: 98.1 F | SYSTOLIC BLOOD PRESSURE: 125 MMHG | DIASTOLIC BLOOD PRESSURE: 73 MMHG | RESPIRATION RATE: 18 BRPM | HEIGHT: 67 IN

## 2018-04-05 DIAGNOSIS — O21.0 HYPEREMESIS OF PREGNANCY: Primary | ICD-10-CM

## 2018-04-05 DIAGNOSIS — N30.00 ACUTE CYSTITIS WITHOUT HEMATURIA: ICD-10-CM

## 2018-04-05 LAB
APPEARANCE UR: CLEAR
BACTERIA URNS QL MICRO: ABNORMAL /HPF
BILIRUB UR QL: NEGATIVE
COLOR UR: ABNORMAL
EPITH CASTS URNS QL MICRO: ABNORMAL /LPF
GLUCOSE UR STRIP.AUTO-MCNC: NEGATIVE MG/DL
HCG UR QL: POSITIVE
HGB UR QL STRIP: NEGATIVE
KETONES UR QL STRIP.AUTO: NEGATIVE MG/DL
LEUKOCYTE ESTERASE UR QL STRIP.AUTO: ABNORMAL
MUCOUS THREADS URNS QL MICRO: ABNORMAL /LPF
NITRITE UR QL STRIP.AUTO: NEGATIVE
PH UR STRIP: 7 [PH] (ref 5–8)
PROT UR STRIP-MCNC: NEGATIVE MG/DL
RBC #/AREA URNS HPF: ABNORMAL /HPF (ref 0–5)
SP GR UR REFRACTOMETRY: 1.01 (ref 1–1.03)
UA: UC IF INDICATED,UAUC: ABNORMAL
UROBILINOGEN UR QL STRIP.AUTO: 0.2 EU/DL (ref 0.2–1)
WBC URNS QL MICRO: ABNORMAL /HPF (ref 0–4)

## 2018-04-05 PROCEDURE — 81025 URINE PREGNANCY TEST: CPT

## 2018-04-05 PROCEDURE — 81001 URINALYSIS AUTO W/SCOPE: CPT | Performed by: EMERGENCY MEDICINE

## 2018-04-05 PROCEDURE — 99283 EMERGENCY DEPT VISIT LOW MDM: CPT

## 2018-04-05 PROCEDURE — 74011250637 HC RX REV CODE- 250/637: Performed by: PHYSICIAN ASSISTANT

## 2018-04-05 PROCEDURE — 87086 URINE CULTURE/COLONY COUNT: CPT | Performed by: EMERGENCY MEDICINE

## 2018-04-05 RX ORDER — NITROFURANTOIN 25; 75 MG/1; MG/1
100 CAPSULE ORAL 2 TIMES DAILY
Qty: 14 CAP | Refills: 0 | Status: SHIPPED | OUTPATIENT
Start: 2018-04-05 | End: 2018-04-12

## 2018-04-05 RX ORDER — ONDANSETRON 4 MG/1
4 TABLET, ORALLY DISINTEGRATING ORAL
Qty: 10 TAB | Refills: 0 | Status: SHIPPED | OUTPATIENT
Start: 2018-04-05 | End: 2020-07-20 | Stop reason: ALTCHOICE

## 2018-04-05 RX ORDER — ONDANSETRON 4 MG/1
4 TABLET, ORALLY DISINTEGRATING ORAL
Status: COMPLETED | OUTPATIENT
Start: 2018-04-05 | End: 2018-04-05

## 2018-04-05 RX ADMIN — ONDANSETRON 4 MG: 4 TABLET, ORALLY DISINTEGRATING ORAL at 17:43

## 2018-04-05 NOTE — ED PROVIDER NOTES
EMERGENCY DEPARTMENT HISTORY AND PHYSICAL EXAM    Date: 2018  Patient Name: Namrata Arrington    History of Presenting Illness     Chief Complaint   Patient presents with    Nausea     started today x 1vomit. History Provided By: Patient    HPI: Namrata Arrington is a 32 y.o. female with No significant past medical history who presents with nausea x 4 today while at work. Pt is 7wks pregnant and states she couldn't get in to see doctor and she had to leave work early. Pt states \"I just need something for this nausea\"  Pt denies any abd pain or dysuria. Pt states pain 0/10. Pt is a A1    PCP: None    Current Facility-Administered Medications   Medication Dose Route Frequency Provider Last Rate Last Dose    ondansetron (ZOFRAN ODT) tablet 4 mg  4 mg Oral NOW Joellen Castleman, PA-C         Current Outpatient Prescriptions   Medication Sig Dispense Refill    ondansetron (ZOFRAN ODT) 4 mg disintegrating tablet Take 1 Tab by mouth every eight (8) hours as needed for Nausea. Indications: EXCESSIVE VOMITING IN PREGNANCY 10 Tab 0    nitrofurantoin, macrocrystal-monohydrate, (MACROBID) 100 mg capsule Take 1 Cap by mouth two (2) times a day for 7 days. 14 Cap 0    butalbital-acetaminophen-caff (FIORICET) -40 mg per capsule Take 1 Cap by mouth every six (6) hours as needed for Headache. Max Daily Amount: 4 Caps. 6 Cap 0    LORazepam (ATIVAN) 0.5 mg tablet Take 1 Tab by mouth every eight (8) hours as needed for Anxiety. Max Daily Amount: 1.5 mg. 20 Tab 0    sertraline (ZOLOFT) 25 mg tablet Take 1 Tab by mouth daily. 30 Tab 0       Past History     Past Medical History:  No past medical history on file. Past Surgical History:  No past surgical history on file. Family History:  No family history on file.     Social History:  Social History   Substance Use Topics    Smoking status: Current Every Day Smoker     Packs/day: 1.00    Smokeless tobacco: Never Used    Alcohol use Yes      Comment: [facility] other day\"       Allergies:  No Known Allergies      Review of Systems   Review of Systems   Constitutional: Negative for fever. Gastrointestinal: Positive for constipation, nausea and vomiting. Negative for abdominal pain. Musculoskeletal: Negative for gait problem. Skin: Negative. Neurological: Negative for speech difficulty and weakness. All other systems reviewed and are negative. Physical Exam     Vitals:    04/05/18 1627   BP: 125/73   Pulse: 98   Resp: 18   Temp: 98.1 °F (36.7 °C)   SpO2: 100%   Weight: 76.2 kg (168 lb)   Height: 5' 7\" (1.702 m)     Physical Exam   Constitutional: She is oriented to person, place, and time. She appears well-developed and well-nourished. No distress. HENT:   Head: Normocephalic and atraumatic. Eyes: Conjunctivae are normal.   Cardiovascular: Normal rate, regular rhythm and normal heart sounds. Pulmonary/Chest: Effort normal and breath sounds normal. No respiratory distress. She has no wheezes. She has no rales. Abdominal: Soft. Bowel sounds are normal. She exhibits no distension. There is no tenderness. There is no rebound and no guarding. Neurological: She is alert and oriented to person, place, and time. Skin: Skin is warm and dry. Psychiatric: She has a normal mood and affect. Her behavior is normal. Judgment and thought content normal.   Nursing note and vitals reviewed.   at 5:43 PM      Diagnostic Study Results     Labs -     Recent Results (from the past 12 hour(s))   URINALYSIS W/ REFLEX CULTURE    Collection Time: 04/05/18  5:06 PM   Result Value Ref Range    Color YELLOW/STRAW      Appearance CLEAR CLEAR      Specific gravity 1.015 1.003 - 1.030      pH (UA) 7.0 5.0 - 8.0      Protein NEGATIVE  NEG mg/dL    Glucose NEGATIVE  NEG mg/dL    Ketone NEGATIVE  NEG mg/dL    Bilirubin NEGATIVE  NEG      Blood NEGATIVE  NEG      Urobilinogen 0.2 0.2 - 1.0 EU/dL    Nitrites NEGATIVE  NEG      Leukocyte Esterase SMALL (A) NEG      WBC 5-10 0 - 4 /hpf    RBC 0-5 0 - 5 /hpf    Epithelial cells FEW FEW /lpf    Bacteria 1+ (A) NEG /hpf    UA:UC IF INDICATED URINE CULTURE ORDERED (A) CNI      Mucus TRACE (A) NEG /lpf   HCG URINE, QL. - POC    Collection Time: 04/05/18  5:07 PM   Result Value Ref Range    Pregnancy test,urine (POC) POSITIVE (A) NEG         Radiologic Studies -   No orders to display     CT Results  (Last 48 hours)    None        CXR Results  (Last 48 hours)    None            Medical Decision Making   I am the first provider for this patient. I reviewed the vital signs, available nursing notes, past medical history, past surgical history, family history and social history. Vital Signs-Reviewed the patient's vital signs. Records Reviewed: Old Medical Records    Disposition:  discharged    DISCHARGE NOTE:   6:00 PM        Care plan outlined and precautions discussed. Patient has no new complaints, changes, or physical findings. Results of UA were reviewed with the patient. All medications were reviewed with the patient; will d/c home. All of pt's questions and concerns were addressed. Patient was instructed and agrees to follow up with OB, as well as to return to the ED upon further deterioration. Patient is ready to go home as long as she tolerates PO challenge prior to discharge    Follow-up Information     Follow up With Details Comments Contact Info    Your OB  As needed           Current Discharge Medication List      START taking these medications    Details   ondansetron (ZOFRAN ODT) 4 mg disintegrating tablet Take 1 Tab by mouth every eight (8) hours as needed for Nausea. Indications: EXCESSIVE VOMITING IN PREGNANCY  Qty: 10 Tab, Refills: 0      nitrofurantoin, macrocrystal-monohydrate, (MACROBID) 100 mg capsule Take 1 Cap by mouth two (2) times a day for 7 days.   Qty: 14 Cap, Refills: 0         CONTINUE these medications which have NOT CHANGED    Details   butalbital-acetaminophen-caff (FIORICET) -40 mg per capsule Take 1 Cap by mouth every six (6) hours as needed for Headache. Max Daily Amount: 4 Caps. Qty: 6 Cap, Refills: 0      LORazepam (ATIVAN) 0.5 mg tablet Take 1 Tab by mouth every eight (8) hours as needed for Anxiety. Max Daily Amount: 1.5 mg.  Qty: 20 Tab, Refills: 0      sertraline (ZOLOFT) 25 mg tablet Take 1 Tab by mouth daily. Qty: 30 Tab, Refills: 0             Provider Notes (Medical Decision Making):   DDX: hyperemesis, morning sickness, UTI    Procedures        Diagnosis     Clinical Impression:   1. Hyperemesis of pregnancy    2.  Acute cystitis without hematuria

## 2018-04-05 NOTE — ED NOTES
Discharge instructions were given to the patient by Billy Downey RN. The patient left the Emergency Department ambulatory, alert and oriented and in no acute distress with 2 prescriptions. The patient was encouraged to call or return to the ED for worsening issues or problems and was encouraged to schedule a follow up appointment for continuing care. The patient verbalized understanding of discharge instructions and prescriptions, all questions were answered. The patient has no further concerns at this time.

## 2018-04-05 NOTE — ED NOTES
Pt presents ambulatory to ED complaining of nausea and vomiting at 7 weeks pregnant. Pt is alert and oriented x 4, RR even and unlabored, skin is warm and dry. Assesment completed and pt updated on plan of care. Emergency Department Nursing Plan of Care       The Nursing Plan of Care is developed from the Nursing assessment and Emergency Department Attending provider initial evaluation. The plan of care may be reviewed in the ED Provider note.     The Plan of Care was developed with the following considerations:   Patient / Family readiness to learn indicated by:verbalized understanding  Persons(s) to be included in education: patient  Barriers to Learning/Limitations:No    Signed     Terell Castillo RN    4/5/2018   5:15 PM

## 2018-04-05 NOTE — LETTER
[facility] EMERGENCY DEPT 
[address] [facility] [address] [id number] [phone] Work/School Note Date: 4/5/2018 To Whom It May concern: 
 
Jennie Medina was seen and treated today in the emergency room by the following provider(s): 
Attending Provider: Sherif Hall MD 
Physician Assistant: Junior Lydia PA-C. Jennie Medina may return to work on 4/7/18. Sincerely, Junior Lydia PA-C

## 2018-04-05 NOTE — DISCHARGE INSTRUCTIONS
Managing Morning Sickness: Care Instructions  Your Care Instructions    For many women, the toughest part of early pregnancy is morning sickness. Morning sickness can range from mild nausea to severe nausea with bouts of vomiting. Symptoms may be worse in the morning, although they can strike at any time of the day or night. If you have nausea, vomiting, or both, look for safe measures that can bring you relief. You can take simple steps at home to manage morning sickness. These steps include changing what and when you eat and avoiding certain foods and smells. Some women find that acupuncture and acupressure wristbands also help. Follow-up care is a key part of your treatment and safety. Be sure to make and go to all appointments, and call your doctor if you are having problems. It's also a good idea to know your test results and keep a list of the medicines you take. How can you care for yourself at home? · Keep food in your stomach, but not too much at once. Your nausea may be worse if your stomach is empty. Eat five or six small meals a day instead of three large meals. · For morning nausea, eat a small snack, such as a couple of crackers or dry biscuits, before rising. Allow a few minutes for your stomach to settle before you get out of bed slowly. · Drink plenty of fluids, enough so that your urine is light yellow or clear like water. If you have kidney, heart, or liver disease and have to limit fluids, talk with your doctor before you increase the amount of fluids you drink. Some women find that peppermint tea helps with nausea. · Eat more protein, such as chicken, fish, lean meat, beans, nuts, and seeds. · Eat carbohydrate foods, such as potatoes, whole-grain cereals, rice, and pasta. · Avoid smells and foods that make you feel nauseated. Spicy or high-fat foods, citrus juice, milk, coffee, and tea with caffeine often make nausea worse. · Do not drink alcohol. · Do not smoke.  Try not to be around others who smoke. If you need help quitting, talk to your doctor about stop-smoking programs and medicines. These can increase your chances of quitting for good. · If you are taking iron supplements, ask your doctor if they are necessary. Iron can make nausea worse. · Get lots of rest. Stress and fatigue can make your morning sickness worse. · Ask your doctor about taking prescription medicine, or over-the-counter products such as vitamin B6, doxylamine, or atif, to relieve your symptoms. Your doctor can tell you the doses that are safe for you. · Take your prenatal vitamins at night on a full stomach. When should you call for help? Call 911 anytime you think you may need emergency care. For example, call if:  ? · You passed out (lost consciousness). ?Call your doctor now or seek immediate medical care if:  ? · You are sick to your stomach or cannot drink fluids. ? · You have symptoms of dehydration, such as:  ¨ Dry eyes and a dry mouth. ¨ Passing only a little urine. ¨ Feeling thirstier than usual.   ? · You are not able to keep down your medicine. ? · You have pain in your belly or pelvis. ? Watch closely for changes in your health, and be sure to contact your doctor if:  ? · You do not get better as expected. Where can you learn more? Go to http://rito-freddy.info/. Enter K796 in the search box to learn more about \"Managing Morning Sickness: Care Instructions. \"  Current as of: March 16, 2017  Content Version: 11.4  © 1655-2606 [employer], [employer]. Care instructions adapted under license by [employer] (which disclaims liability or warranty for this information). If you have questions about a medical condition or this instruction, always ask your healthcare professional. [address] any warranty or liability for your use of this information.

## 2018-04-07 LAB
BACTERIA SPEC CULT: NORMAL
CC UR VC: NORMAL
SERVICE CMNT-IMP: NORMAL

## 2019-02-26 ENCOUNTER — HOSPITAL ENCOUNTER (EMERGENCY)
Age: 29
Discharge: HOME | End: 2019-02-26
Attending: EMERGENCY MEDICINE
Payer: SELF-PAY

## 2019-02-26 VITALS
HEIGHT: 69 IN | SYSTOLIC BLOOD PRESSURE: 133 MMHG | TEMPERATURE: 98.1 F | DIASTOLIC BLOOD PRESSURE: 86 MMHG | OXYGEN SATURATION: 99 % | BODY MASS INDEX: 24.88 KG/M2 | RESPIRATION RATE: 17 BRPM | WEIGHT: 168 LBS | HEART RATE: 92 BPM

## 2019-02-26 DIAGNOSIS — L73.9 FOLLICULITIS: Primary | ICD-10-CM
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
## 2019-02-26 PROCEDURE — 99282 EMERGENCY DEPT VISIT SF MDM: CPT

## 2019-02-26 RX ORDER — CEPHALEXIN 500 MG/1
500 CAPSULE ORAL 4 TIMES DAILY
Qty: 28 CAP | Refills: 0 | Status: SHIPPED | OUTPATIENT
Start: 2019-02-26 | End: 2019-03-05

## 2019-02-26 RX ORDER — IBUPROFEN 400 MG/1
400 TABLET ORAL
Qty: 20 TAB | Refills: 0 | OUTPATIENT
Start: 2019-02-26 | End: 2019-10-21

## 2019-02-26 NOTE — LETTER
[facility] EMERGENCY DEPT 
[address] [facility] [address] [id number] 
[phone] Work/School Note Date: 2/26/2019 To Whom It May concern: 
 
Ashleigh Suero was seen and treated today in the emergency room by the following provider(s): 
Attending Provider: Arminda Rodriguez MD 
Physician Assistant: DELLA Ace. Please excuse her from work today.  
 
Sincerely, 
 
 
 
 
DELLA Maldonado

## 2019-02-26 NOTE — ED PROVIDER NOTES
EMERGENCY DEPARTMENT HISTORY AND PHYSICAL EXAM    Date: 2/26/2019  Patient Name: George Mitchell    History of Presenting Illness     Chief Complaint   Patient presents with   [address]     pt reported something bit on her upper lip on rt side at work yesterday. History Provided By: Patient      HPI: George Mitchell is a 25 y.o. female with a PMH of No significant past medical history who presents with insect bite to her right upper lip that she sustained 2 days ago. The area was initially small but has gotten gradually larger. It was pruritic but now is painful. She tried applying ice to the area without relief. She did not see the insect that bit her. She denies fever. PCP: None    Current Outpatient Medications   Medication Sig Dispense Refill    cephALEXin (KEFLEX) 500 mg capsule Take 1 Cap by mouth four (4) times daily for 7 days. 28 Cap 0    ibuprofen (MOTRIN) 400 mg tablet Take 1 Tab by mouth every six (6) hours as needed for Pain. 20 Tab 0    ondansetron (ZOFRAN ODT) 4 mg disintegrating tablet Take 1 Tab by mouth every eight (8) hours as needed for Nausea. Indications: EXCESSIVE VOMITING IN PREGNANCY 10 Tab 0    butalbital-acetaminophen-caff (FIORICET) -40 mg per capsule Take 1 Cap by mouth every six (6) hours as needed for Headache. Max Daily Amount: 4 Caps. 6 Cap 0    LORazepam (ATIVAN) 0.5 mg tablet Take 1 Tab by mouth every eight (8) hours as needed for Anxiety. Max Daily Amount: 1.5 mg. 20 Tab 0    sertraline (ZOLOFT) 25 mg tablet Take 1 Tab by mouth daily. 30 Tab 0       Past History     Past Medical History:  History reviewed. No pertinent past medical history. Past Surgical History:  History reviewed. No pertinent surgical history. Family History:  History reviewed. No pertinent family history.     Social History:  Social History     Tobacco Use    Smoking status: Current Every Day Smoker     Packs/day: 1.00    Smokeless tobacco: Never Used   Substance Use Topics    Alcohol use: Yes     Comment: \"every other day\"    Drug use: Yes     Types: Marijuana     Comment: occ       Allergies:  No Known Allergies      Review of Systems   Review of Systems   Constitutional: Negative for chills and fever. HENT: Negative for ear pain and sore throat. Eyes: Negative for redness and visual disturbance. Respiratory: Negative for cough and shortness of breath. Cardiovascular: Negative for chest pain and palpitations. Gastrointestinal: Negative for abdominal pain, nausea and vomiting. Genitourinary: Negative for dysuria and hematuria. Musculoskeletal: Negative for back pain and gait problem. Skin: Positive for rash. Negative for wound. Neurological: Negative for dizziness and headaches. Psychiatric/Behavioral: Negative for behavioral problems and confusion. All other systems reviewed and are negative. Physical Exam     Vitals:    02/26/19 1357   BP: 133/86   Pulse: 92   Resp: 17   Temp: 98.1 °F (36.7 °C)   SpO2: 99%   Weight: 76.2 kg (168 lb)   Height: 5' 9\" (1.753 m)     Physical Exam   Constitutional: She is oriented to person, place, and time. She appears well-developed and well-nourished. HENT:   Head: Normocephalic and atraumatic. Eyes: Conjunctivae and EOM are normal. Pupils are equal, round, and reactive to light. Neck: Normal range of motion. Neck supple. Cardiovascular: Normal rate, regular rhythm and normal heart sounds. Pulmonary/Chest: Effort normal and breath sounds normal.   Musculoskeletal: Normal range of motion. Neurological: She is alert and oriented to person, place, and time. She has normal strength. No cranial nerve deficit or sensory deficit. GCS eye subscore is 4. GCS verbal subscore is 5. GCS motor subscore is 6. Skin: Skin is warm and dry. Cluster of erythematous pustules above the right upper lip. Psychiatric: She has a normal mood and affect. Her behavior is normal.   Nursing note and vitals reviewed.         Diagnostic Study Results     Labs -   No results found for this or any previous visit (from the past 12 hour(s)). Radiologic Studies -   No orders to display     CT Results  (Last 48 hours)    None        CXR Results  (Last 48 hours)    None            Medical Decision Making   I am the first provider for this patient. I reviewed the vital signs, available nursing notes, past medical history, past surgical history, family history and social history. Vital Signs-Reviewed the patient's vital signs. Records Reviewed: Nursing Notes and Old Medical Records            Disposition:  Discharged    DISCHARGE NOTE:   2:21 PM  The pt is ready for discharge. The pt's signs, symptoms, diagnosis, and discharge instructions have been discussed and pt has conveyed their understanding. The pt is to follow up as recommended or return to ER should their symptoms worsen. Plan has been discussed and pt is in agreement. Follow-up Information     Follow up With Specialties Details Why [address] [facility],[address] [facility]  Call to establish care with a family doctor [address] [id number] [phone]    [facility] EMERGENCY DEPT Emergency Medicine  If symptoms worsen [address]          Discharge Medication List as of 2/26/2019  2:16 PM      START taking these medications    Details   cephALEXin (KEFLEX) 500 mg capsule Take 1 Cap by mouth four (4) times daily for 7 days. , Normal, Disp-28 Cap, R-0      ibuprofen (MOTRIN) 400 mg tablet Take 1 Tab by mouth every six (6) hours as needed for Pain., Normal, Disp-20 Tab, R-0         CONTINUE these medications which have NOT CHANGED    Details   ondansetron (ZOFRAN ODT) 4 mg disintegrating tablet Take 1 Tab by mouth every eight (8) hours as needed for Nausea.  Indications: EXCESSIVE VOMITING IN PREGNANCY, Normal, Disp-10 Tab, R-0 butalbital-acetaminophen-caff (FIORICET) -40 mg per capsule Take 1 Cap by mouth every six (6) hours as needed for Headache. Max Daily Amount: 4 Caps., Print, Disp-6 Cap, R-0      LORazepam (ATIVAN) 0.5 mg tablet Take 1 Tab by mouth every eight (8) hours as needed for Anxiety. Max Daily Amount: 1.5 mg., Print, Disp-20 Tab, R-0      sertraline (ZOLOFT) 25 mg tablet Take 1 Tab by mouth daily. , Print, Disp-30 Tab, R-0             Provider Notes (Medical Decision Making):   Patient presents with insect bite to her right upper lip. I suspect she scratched the area and then developed a folliculitis. Will start on keflex and NSAID. Procedures:  Procedures        Diagnosis     Clinical Impression:   1. Folliculitis    2.  Insect bite, initial encounter

## 2019-02-26 NOTE — ED NOTES
Emergency Department Nursing Plan of Care       The Nursing Plan of Care is developed from the Nursing assessment and Emergency Department Attending provider initial evaluation. The plan of care may be reviewed in the ED Provider note.     The Plan of Care was developed with the following considerations:   Patient / Family readiness to learn indicated by:verbalized understanding  Persons(s) to be included in education: patient  Barriers to Learning/Limitations:No    [address]     2/26/2019   2:00 PM

## 2019-02-26 NOTE — DISCHARGE INSTRUCTIONS
Patient Education     Patient Education        Folliculitis: Care Instructions  Your Care Instructions    Folliculitis (say \"qpo-SUL-qth-LY-tus\") is an infection of the pouches (follicles) in the skin where hair grows. It can occur on any part of the body, but it is most common on the scalp, face, armpits, and groin. Bacteria, such as those found in a hot tub, can cause folliculitis. Folliculitis begins as a red, tender area near a strand of hair. The skin can itch or burn and may drain pus or blood. Sometimes folliculitis can lead to more serious skin infections. Your doctor usually can treat mild folliculitis with an antibiotic cream or ointment. If you have folliculitis on your scalp, you may use a shampoo that kills bacteria. Antibiotics you take as pills can treat infections deeper in the skin. For stubborn cases of folliculitis, laser treatment may be an option. Laser treatment uses strong beams of light to destroy the hair follicle. But hair will no longer grow in the treated area. Follow-up care is a key part of your treatment and safety. Be sure to make and go to all appointments, and call your doctor if you are having problems. It's also a good idea to know your test results and keep a list of the medicines you take. How can you care for yourself at home? · Take your medicine exactly as prescribed. If your doctor prescribed antibiotics, take them as directed. Do not stop taking them just because you feel better. You need to take the full course of antibiotics. · Use a soap that kills bacteria to wash the infected area. If your scalp or beard is infected, use a shampoo with selenium or propylene glycol. Be careful. Do not scrub too long or too hard. · Mix 1 1/3 cup warm water and 1 tablespoon vinegar. Soak a cloth in the mixture, and place it over the infected skin until it cools off (usually 5 to 10 minutes). You can do this 3 to 6 times a day. · Do not share your razor, towel, or washcloth.  That can spread folliculitis. · Use a new blade in your razor each time you shave to keep from re-infecting your skin. · If you tend to get folliculitis, avoid using hot tubs. They can contain bacteria that cause folliculitis. When should you call for help? Call your doctor now or seek immediate medical care if:    · You have symptoms of infection, such as:  ? Increased pain, swelling, warmth, or redness. ? Red streaks leading from the area. ? Pus draining from the area. ? A fever.    Watch closely for changes in your health, and be sure to contact your doctor if:    · You do not get better as expected. Where can you learn more? Go to http://rito-freddy.info/. Enter M257 in the search box to learn more about \"Folliculitis: Care Instructions. \"  Current as of: April 17, 2018  Content Version: 11.9  © 1862-6030 [employer]. Care instructions adapted under license by [employer] (which disclaims liability or warranty for this information). If you have questions about a medical condition or this instruction, always ask your healthcare professional. [place] [address] any warranty or liability for your use of this information. Insect Stings and Bites: Care Instructions  Your Care Instructions  Stings and bites from bees, wasps, ants, and other insects often cause pain, swelling, redness, and itching. In some people, especially children, the redness and swelling may be worse. It may extend several inches beyond the affected area. But in most cases, stings and bites don't cause reactions all over the body. If you have had a reaction to an insect sting or bite, you are at risk for a reaction if you get stung or bitten again. Follow-up care is a key part of your treatment and safety. Be sure to make and go to all appointments, and call your doctor if you are having problems.  It's also a good idea to know your test results and keep a list of the medicines you take. How can you care for yourself at home? · Do not scratch or rub the skin where the sting or bite occurred. · Put a cold pack or ice cube on the area. Put a thin cloth between the ice and your skin. For some people, a paste of baking soda mixed with a little water helps relieve pain and decrease the reaction. · Take an over-the-counter antihistamine, such as diphenhydramine (Benadryl) or loratadine (Claritin), to relieve swelling, redness, and itching. Calamine lotion or hydrocortisone cream may also help. Do not give antihistamines to your child unless you have checked with the doctor first.  · Be safe with medicines. If your doctor prescribed medicine for your allergy, take it exactly as prescribed. Call your doctor if you think you are having a problem with your medicine. You will get more details on the specific medicines your doctor prescribes. · Your doctor may prescribe a shot of epinephrine to carry with you in case you have a severe reaction. Learn how and when to give yourself the shot, and keep it with you at all times. Make sure it has not . · Go to the emergency room anytime you have a severe reaction. Go even if you have given yourself epinephrine and are feeling better. Symptoms can come back. When should you call for help? Call 911 anytime you think you may need emergency care. For example, call if:    · You have symptoms of a severe allergic reaction. These may include:  ? Sudden raised, red areas (hives) all over your body. ? Swelling of the throat, mouth, lips, or tongue. ? Trouble breathing. ? Passing out (losing consciousness). Or you may feel very lightheaded or suddenly feel weak, confused, or restless.    Call your doctor now or seek immediate medical care if:    · You have symptoms of an allergic reaction not right at the sting or bite, such as:  ? A rash or small area of hives (raised, red areas on the skin). ? Itching. ? Swelling. ?  Belly pain, nausea, or vomiting.     · You have a lot of swelling around the site (such as your entire arm or leg is swollen).     · You have signs of infection, such as:  ? Increased pain, swelling, redness, or warmth around the sting. ? Red streaks leading from the area. ? Pus draining from the sting. ? A fever.    Watch closely for changes in your health, and be sure to contact your doctor if:    · You do not get better as expected. Where can you learn more? Go to http://rito-freddy.info/. Enter P390 in the search box to learn more about \"Insect Stings and Bites: Care Instructions. \"  Current as of: September 23, 2018  Content Version: 11.9  © 8688-3366 [employer]. Care instructions adapted under license by [employer] (which disclaims liability or warranty for this information). If you have questions about a medical condition or this instruction, always ask your healthcare professional. [place] [address] any warranty or liability for your use of this information.

## 2019-10-21 ENCOUNTER — HOSPITAL ENCOUNTER (EMERGENCY)
Age: 29
Discharge: HOME | End: 2019-10-21
Attending: EMERGENCY MEDICINE
Payer: MEDICAID

## 2019-10-21 VITALS
HEART RATE: 92 BPM | DIASTOLIC BLOOD PRESSURE: 76 MMHG | TEMPERATURE: 98.5 F | SYSTOLIC BLOOD PRESSURE: 117 MMHG | RESPIRATION RATE: 16 BRPM | WEIGHT: 165 LBS | BODY MASS INDEX: 25.01 KG/M2 | HEIGHT: 68 IN | OXYGEN SATURATION: 100 %

## 2019-10-21 DIAGNOSIS — T14.8XXA MUSCLE STRAIN: Primary | ICD-10-CM

## 2019-10-21 PROCEDURE — 99282 EMERGENCY DEPT VISIT SF MDM: CPT

## 2019-10-21 RX ORDER — IBUPROFEN 800 MG/1
800 TABLET ORAL
Qty: 30 TAB | Refills: 0 | OUTPATIENT
Start: 2019-10-21 | End: 2021-05-10

## 2019-10-21 NOTE — ED NOTES
Pt for DC home and plan of care accepted by pt. Pt left unit steady gait. Patient (s)  given copy of dc instructions and 1 script(s). Patient (s)  verbalized understanding of instructions and script (s). Patient given a current medication reconciliation form and verbalized understanding of their medications. Patient (s) verbalized understanding of the importance of discussing medications with  his or her physician or clinic they will be following up with. Patient alert and oriented and in no acute distress. Patient discharged home ambulatory with self.

## 2019-10-21 NOTE — DISCHARGE INSTRUCTIONS
Patient Education        Learning About Returning to Activity After Injury  What's important to know about injury recovery? Recovery from an injury takes time. Healing can take longer than you want it to. You may be tempted to return to your normal activities before you have fully healed. But stressing an injury makes it take even longer to heal. And you could make your injury worse than it's ever been. An injury heals fastest when you give it the rest and special care it needs. Your doctor can help you know when you're ready to ease back into normal activity. How can you help an injury heal?  You can speed up healing by avoiding movements that make it worse. It's also important to follow your doctor's instructions. · Ask your doctor if you can take an over-the-counter pain medicine, such as acetaminophen (Tylenol), ibuprofen (Advil, Motrin), or naproxen (Aleve). Be safe with medicines. Read and follow all instructions on the label. · Put ice or a cold pack on the area for 10 to 20 minutes at a time to ease pain. Put a thin cloth between the ice and your skin. · If your doctor gave you a splint, a pair of crutches, or a sling, use it exactly as directed. Physical or occupational therapy can help you learn how to move in new ways and to recover from an injury. If you are given exercises to do, ease into them. Start each exercise slowly. Ease off an exercise if you start to have pain. When you have a routine of exercises, do them as often and as long as prescribed. While you heal, it also helps to keep the rest of your body moving. A physical therapist can suggest other exercises or ways of doing things to keep up your strength and energy. How do you return to activity? Slowly ease back into normal activity so you don't injure yourself again. A reinjury can be harder to heal than an original injury. If you are getting back into a sport, do it step by step.  A  or physical therapist can help you do this safely. Start with short, easy movements or workouts. Then slowly add more over time. · Warm up before and stretch after the activity. · Stop what you're doing if it hurts. · When you're done, use ice to prevent pain and swelling. It may help to make some changes. For example, if a sport caused tendon pain, try another one for a while. If using a tool causes pain, change your  or use the other hand. When should you call for help? Watch closely for changes in your health, and be sure to contact your doctor if:  · Your symptoms are getting worse. · You have new symptoms, such as numbness or weakness. · You do not get better as expected. Follow-up care is a key part of your treatment and safety. Be sure to make and go to all appointments, and call your doctor if you are having problems. It's also a good idea to know your test results and keep a list of the medicines you take. Where can you learn more? Go to http://rito-freddy.info/. Enter B834 in the search box to learn more about \"Learning About Returning to Activity After Injury. \"  Current as of: June 26, 2019  Content Version: 12.2  © 9633-4102 [employer]. Care instructions adapted under license by [employer] (which disclaims liability or warranty for this information). If you have questions about a medical condition or this instruction, always ask your healthcare professional. [place] [address] any warranty or liability for your use of this information. Patient Education        Learning About RICE (Rest, Ice, Compression, and Elevation)  What is RICE? RICE is a way to care for an injury. RICE helps relieve pain and swelling. It may also help with healing and flexibility. RICE stands for:  · Rest and protect the injured or sore area. · Ice or a cold pack used as soon as possible. · Compression, or wrapping the injured or sore area with an elastic bandage.   · Elevation (propping up) the injured or sore area. How do you do RICE? You can use RICE for home treatment when you have general aches and pains or after an injury or surgery. Rest  · Do not put weight on the injury for at least 24 to 48 hours. · Use crutches for a badly sprained knee or ankle. · Support a sprained wrist, elbow, or shoulder with a sling. Ice  · Put ice or a cold pack on the injury right away to reduce pain and swelling. Frozen vegetables will also work as an ice pack. Put a thin cloth between the ice or cold pack and your skin. The cloth protects the injured area from getting too cold. · Use ice for 10 to 15 minutes at a time for the first 48 to 72 hours. Compression  · Use compression for sprains, strains, and surgeries of the arms and legs. · Wrap the injured area with an elastic bandage or compression sleeve to reduce swelling. · Don't wrap it too tightly. If the area below it feels numb, tingles, or feels cool, loosen the wrap. Elevation  · Use elevation for areas of the body that can be propped up, such as arms and legs. · Prop up the injured area on pillows whenever you use ice. Keep it propped up anytime you sit or lie down. · Try to keep the injured area at or above the level of your heart. This will help reduce swelling and bruising. Where can you learn more? Go to http://rito-freddy.info/. Enter Q106 in the search box to learn more about \"Learning About RICE (Rest, Ice, Compression, and Elevation). \"  Current as of: June 26, 2019  Content Version: 12.2  © 1443-7892 [employer]. Care instructions adapted under license by [employer] (which disclaims liability or warranty for this information). If you have questions about a medical condition or this instruction, always ask your healthcare professional. [address] any warranty or liability for your use of this information.

## 2019-10-21 NOTE — LETTER
[facility] EMERGENCY DEPT 
[address] [address] 
[phone] Work/School Note Date: 10/21/2019 To Whom It May concern: 
 
Radha Baker was seen and treated today in the emergency room by the following provider(s): 
Attending Provider: Hay Wyman MD. Radha Baker may return to work on 10/22/2019.  
 
Sincerely, 
 
 
 
 
Laura Nieto MD

## 2019-10-21 NOTE — ED PROVIDER NOTES
EMERGENCY DEPARTMENT HISTORY AND PHYSICAL EXAM      Date: 10/21/2019  Patient Name: Von Capellan    History of Presenting Illness     Chief Complaint   Patient presents with    Back Pain     History Provided By: Patient    HPI: Von Capellan, 30 y.o. female with no significant past medical history who presents via private vehicle to the ED with cc of right-sided subscapular pain that started yesterday. Patient states she does a lot of heavy lifting at work and believes she pulled a muscle. 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 She denies any specific injury but states that certain range of motion makes the pain worse. She tried taking over-the-counter Tylenol with minimal improvement of symptoms. She denies any dysuria, hematuria, shortness of breath, or pleuritic pain. PMHx: None  Social Hx: Occasionally smokes, denies alcohol use, denies illegal drug use    PCP: None    There are no other complaints, changes, or physical findings at this time. No current facility-administered medications on file prior to encounter. Current Outpatient Medications on File Prior to Encounter   Medication Sig Dispense Refill    [DISCONTINUED] ibuprofen (MOTRIN) 400 mg tablet Take 1 Tab by mouth every six (6) hours as needed for Pain. 20 Tab 0    ondansetron (ZOFRAN ODT) 4 mg disintegrating tablet Take 1 Tab by mouth every eight (8) hours as needed for Nausea. Indications: EXCESSIVE VOMITING IN PREGNANCY 10 Tab 0    butalbital-acetaminophen-caff (FIORICET) -40 mg per capsule Take 1 Cap by mouth every six (6) hours as needed for Headache. Max Daily Amount: 4 Caps. 6 Cap 0    LORazepam (ATIVAN) 0.5 mg tablet Take 1 Tab by mouth every eight (8) hours as needed for Anxiety. Max Daily Amount: 1.5 mg. 20 Tab 0    sertraline (ZOLOFT) 25 mg tablet Take 1 Tab by mouth daily. 30 Tab 0     Past History     Past Medical History:  History reviewed.  No pertinent past medical history. Past Surgical History:  History reviewed. No pertinent surgical history. Family History:  History reviewed. No pertinent family history. Social History:  Social History     Tobacco Use    Smoking status: Current Every Day Smoker     Packs/day: 1.00    Smokeless tobacco: Never Used   Substance Use Topics    Alcohol use: Yes     Comment: \"every other day\"    Drug use: Yes     Types: Marijuana     Comment: occ     Allergies:  No Known Allergies  Review of Systems   Review of Systems   Constitutional: Negative for chills and fever. HENT: Negative for congestion, rhinorrhea, sneezing and sore throat. Respiratory: Negative for shortness of breath. Cardiovascular: Negative for chest pain. Gastrointestinal: Negative for abdominal pain, nausea and vomiting. Musculoskeletal: Positive for back pain and myalgias. Negative for neck stiffness. Skin: Negative for rash. Neurological: Negative for dizziness, weakness and headaches. All other systems reviewed and are negative. Physical Exam   Physical Exam   Constitutional: She is oriented to person, place, and time. She appears well-developed and well-nourished. HENT:   Head: Normocephalic and atraumatic. Mouth/Throat: Oropharynx is clear and moist.   Eyes: Conjunctivae and EOM are normal.   Neck: Normal range of motion and full passive range of motion without pain. Neck supple. Cardiovascular: Normal rate, regular rhythm, S1 normal, S2 normal, normal heart sounds, intact distal pulses and normal pulses. No murmur heard. Pulmonary/Chest: Effort normal and breath sounds normal. No respiratory distress. She has no wheezes. Abdominal: Soft. Normal appearance and bowel sounds are normal. She exhibits no distension. There is no tenderness. There is no rebound and no CVA tenderness. Musculoskeletal: Normal range of motion. Back:    Neurological: She is alert and oriented to person, place, and time. She has normal strength. Skin: Skin is warm, dry and intact. No rash noted. Psychiatric: She has a normal mood and affect. Her speech is normal and behavior is normal. Judgment and thought content normal.   Nursing note and vitals reviewed. Diagnostic Study Results   Labs -   No results found for this or any previous visit (from the past 12 hour(s)). Radiologic Studies -   No orders to display     No results found. Medical Decision Making   I am the first provider for this patient. I reviewed the vital signs, available nursing notes, past medical history, past surgical history, family history and social history. Vital Signs-Reviewed the patient's vital signs. Patient Vitals for the past 12 hrs:   Temp Pulse Resp BP SpO2   10/21/19 0908 98.5 °F (36.9 °C) 92 16 117/76 100 %     Pulse Oximetry Analysis - 100% on RA    Records Reviewed: Nursing Notes    Provider Notes (Medical Decision Making):   25-year-old female presents with right subscapular pain with no history of trauma. Suspect this is musculoskeletal strain. Will treat with ibuprofen, stretching, and rest and have patient follow-up with primary care. ED Course:   Initial assessment performed. The patients presenting problems have been discussed, and they are in agreement with the care plan formulated and outlined with them. I have encouraged them to ask questions as they arise throughout their visit. Progress Note:   Updated pt on all returned results and findings. Discussed the importance of proper follow up as referred below along with return precautions. Pt in agreement with the care plan and expresses agreement with and understanding of all items discussed. Disposition:  Discharge Note:  The pt is ready for discharge. The pt's signs, symptoms, diagnosis, and discharge instructions have been discussed and pt has conveyed their understanding. The pt is to follow up as recommended or return to ER should their symptoms worsen.  Plan has been discussed and pt is in agreement. PLAN:  1. Current Discharge Medication List      CONTINUE these medications which have CHANGED    Details   ibuprofen (MOTRIN) 800 mg tablet Take 1 Tab by mouth every eight (8) hours as needed for Pain. Qty: 30 Tab, Refills: 0           2. Follow-up Information     Follow up With Specialties Details Why Contact Info    Dr. Mitul Sinha  Call      [facility] EMERGENCY DEPT Emergency Medicine  As needed, If symptoms worsen [address] [facility]  [phone]        Return to ED if worse     Diagnosis     Clinical Impression:   1. Muscle strain            Please note that this dictation was completed with Dragon, computer voice recognition software. Quite often unanticipated grammatical, syntax, homophones, and other interpretive errors are inadvertently transcribed by the computer software. Please disregard these errors. Additionally, please excuse any errors that have escaped final proofreading.

## 2019-10-21 NOTE — ED NOTES
Emergency Department Nursing Plan of Care       The Nursing Plan of Care is developed from the Nursing assessment and Emergency Department Attending provider initial evaluation. The plan of care may be reviewed in the ED Provider note.     The Plan of Care was developed with the following considerations:   Patient / Family readiness to learn indicated by:verbalized understanding  Persons(s) to be included in education: patient  Barriers to Learning/Limitations:No    Signed     Myrna Moore RN    10/21/2019   9:13 AM

## 2020-05-11 ENCOUNTER — HOSPITAL ENCOUNTER (EMERGENCY)
Age: 30
Discharge: HOME | End: 2020-05-11
Attending: EMERGENCY MEDICINE
Payer: MEDICAID

## 2020-05-11 VITALS
RESPIRATION RATE: 18 BRPM | DIASTOLIC BLOOD PRESSURE: 84 MMHG | HEART RATE: 115 BPM | BODY MASS INDEX: 33.12 KG/M2 | HEIGHT: 67 IN | TEMPERATURE: 98 F | SYSTOLIC BLOOD PRESSURE: 140 MMHG | WEIGHT: 211 LBS | OXYGEN SATURATION: 100 %

## 2020-05-11 DIAGNOSIS — Z77.098 CHEMICAL EXPOSURE OF EYE: ICD-10-CM

## 2020-05-11 DIAGNOSIS — H57.13 PAIN OF BOTH EYES: ICD-10-CM

## 2020-05-11 DIAGNOSIS — H10.213 CHEMICAL CONJUNCTIVITIS OF BOTH EYES: Primary | ICD-10-CM

## 2020-05-11 PROCEDURE — 99282 EMERGENCY DEPT VISIT SF MDM: CPT

## 2020-05-11 RX ORDER — KETOROLAC TROMETHAMINE 5 MG/ML
1 SOLUTION OPHTHALMIC 4 TIMES DAILY
Qty: 1 BOTTLE | Refills: 0 | Status: SHIPPED | OUTPATIENT
Start: 2020-05-11 | End: 2020-05-21
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
## 2020-05-11 NOTE — LETTER
NOTIFICATION RETURN TO WORK / SCHOOL 
 
5/11/2020 10:22 AM 
 
Ms. Hailey Robert [address] To Whom It May Concern: 
 
Hailey Robert is currently under the care of [facility] EMERGENCY DEPT. She will return to work/school on: 5/12/2020 Hailey Robert may return to work/school with the following restrictions: none. If there are questions or concerns please have the patient contact our office. Sincerely, Hector Huitron MD

## 2020-05-11 NOTE — ED NOTES
Pt given printed discharge instructions and 1 script(s). Pt verbalized understanding of instructions and script(s). Pt verbalized importance of following up with PCP via [address] Pt alert and oriented, in no acute distress, ambulatory with self. Pt given work note.
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
## 2020-07-20 ENCOUNTER — APPOINTMENT (OUTPATIENT)
Dept: GENERAL RADIOLOGY | Age: 30
End: 2020-07-20
Attending: PHYSICIAN ASSISTANT
Payer: MEDICAID

## 2020-07-20 ENCOUNTER — HOSPITAL ENCOUNTER (EMERGENCY)
Age: 30
Discharge: HOME | End: 2020-07-20
Attending: EMERGENCY MEDICINE | Admitting: EMERGENCY MEDICINE
Payer: MEDICAID

## 2020-07-20 ENCOUNTER — APPOINTMENT (OUTPATIENT)
Dept: GENERAL RADIOLOGY | Age: 30
End: 2020-07-20
Attending: EMERGENCY MEDICINE
Payer: MEDICAID

## 2020-07-20 VITALS
TEMPERATURE: 97.9 F | OXYGEN SATURATION: 100 % | DIASTOLIC BLOOD PRESSURE: 83 MMHG | HEART RATE: 85 BPM | RESPIRATION RATE: 20 BRPM | WEIGHT: 169.75 LBS | HEIGHT: 68 IN | BODY MASS INDEX: 25.73 KG/M2 | SYSTOLIC BLOOD PRESSURE: 122 MMHG

## 2020-07-20 DIAGNOSIS — S92.351A CLOSED FRACTURE OF BASE OF FIFTH METATARSAL BONE OF RIGHT FOOT, INITIAL ENCOUNTER: Primary | ICD-10-CM

## 2020-07-20 PROCEDURE — 72072 X-RAY EXAM THORAC SPINE 3VWS: CPT

## 2020-07-20 PROCEDURE — 75810000053 HC SPLINT APPLICATION

## 2020-07-20 PROCEDURE — 73630 X-RAY EXAM OF FOOT: CPT

## 2020-07-20 PROCEDURE — 74011250637 HC RX REV CODE- 250/637: Performed by: PHYSICIAN ASSISTANT

## 2020-07-20 PROCEDURE — 99283 EMERGENCY DEPT VISIT LOW MDM: CPT

## 2020-07-20 RX ORDER — ONDANSETRON 4 MG/1
4 TABLET, FILM COATED ORAL
Qty: 15 TAB | Refills: 0 | Status: SHIPPED | OUTPATIENT
Start: 2020-07-20 | End: 2021-06-04

## 2020-07-20 RX ORDER — HYDROCODONE BITARTRATE AND ACETAMINOPHEN 5; 325 MG/1; MG/1
1 TABLET ORAL
Qty: 10 TAB | Refills: 0 | Status: SHIPPED | OUTPATIENT
Start: 2020-07-20 | End: 2020-07-23

## 2020-07-20 RX ORDER — HYDROCODONE BITARTRATE AND ACETAMINOPHEN 5; 325 MG/1; MG/1
1 TABLET ORAL
Status: COMPLETED | OUTPATIENT
Start: 2020-07-20 | End: 2020-07-20

## 2020-07-20 RX ORDER — NALOXONE HYDROCHLORIDE 4 MG/.1ML
SPRAY NASAL
Qty: 1 EACH | Refills: 0 | Status: SHIPPED | OUTPATIENT
Start: 2020-07-20 | End: 2022-08-25 | Stop reason: ALTCHOICE

## 2020-07-20 RX ADMIN — HYDROCODONE BITARTRATE AND ACETAMINOPHEN 1 TABLET: 5; 325 TABLET ORAL at 08:49

## 2020-07-20 NOTE — ED TRIAGE NOTES
Triage Note: Patient is coming in with right foot pain from falling down steps last night. Pain is to the outer aspect of the right foot and up into the right leg.

## 2020-07-20 NOTE — ED PROVIDER NOTES
HPI     Presents the emergency department today with 8 out of 10 pain in her right foot. She also has 6 out of 10 pain in her thoracic region. Patient fell down 5 steps later this morning at her sister's house. She has extreme pain every time she attempts to ambulate on the right foot. She denies any numbness, tingling, color change. Pain radiates into her ankle. Patient denies any head neck or low back pain, denies loss of consciousness. Not having any shortness of breath or difficulty breathing. History reviewed. No pertinent past medical history. History reviewed. No pertinent surgical history. History reviewed. No pertinent family history.     Social History     Socioeconomic History    Marital status: UNKNOWN     Spouse name: Not on file    Number of children: Not on file    Years of education: Not on file    Highest education level: Not on file   Occupational History    Not on file   Social Needs    Financial resource strain: Not on file    Food insecurity     Worry: Not on file     Inability: Not on file    Transportation needs     Medical: Not on file     Non-medical: Not on file   Tobacco Use    Smoking status: Former Smoker     Packs/day: 1.00    Smokeless tobacco: Never Used   Substance and Sexual Activity    Alcohol use: Yes     Comment: \"every other day\"    Drug use: Not Currently     Types: Marijuana     Comment: occ    Sexual activity: Never   Lifestyle    Physical activity     Days per week: Not on file     Minutes per session: Not on file    Stress: Not on file   Relationships    Social connections     Talks on phone: Not on file     Gets together: Not on file     Attends Yazidi service: Not on file     Active member of club or organization: Not on file     Attends meetings of clubs or organizations: Not on file     Relationship status: Not on file    Intimate partner violence     Fear of current or ex partner: Not on file     Emotionally abused: Not on file Physically abused: Not on file     Forced sexual activity: Not on file   Other Topics Concern    Not on file   Social History Narrative    Not on file         ALLERGIES: Patient has no known allergies. Review of Systems   Constitutional: Negative for activity change, appetite change, fatigue and fever. HENT: Negative for ear pain, rhinorrhea, sore throat and trouble swallowing. Eyes: Negative for photophobia and visual disturbance. Respiratory: Negative for cough, chest tightness and shortness of breath. Cardiovascular: Negative for chest pain, palpitations and leg swelling. Gastrointestinal: Negative for abdominal pain, diarrhea, nausea and vomiting. Endocrine: Negative for polyuria. Genitourinary: Negative for dysuria, frequency and urgency. Musculoskeletal: Positive for back pain. Negative for neck pain. Foot pain, right   Skin: Negative for color change. Neurological: Negative for dizziness, weakness, light-headedness, numbness and headaches. Hematological: Negative for adenopathy. Does not bruise/bleed easily. Vitals:    07/20/20 0740   BP: 122/83   Pulse: 85   Resp: 20   Temp: 97.9 °F (36.6 °C)   SpO2: 100%   Weight: 77 kg (169 lb 12.1 oz)   Height: 5' 8\" (1.727 m)            Physical Exam  Vitals signs and nursing note reviewed. Constitutional:       General: She is not in acute distress. Appearance: Normal appearance. She is normal weight. She is not ill-appearing, toxic-appearing or diaphoretic. HENT:      Head: Normocephalic and atraumatic. Right Ear: External ear normal.      Left Ear: External ear normal.   Eyes:      Extraocular Movements: Extraocular movements intact. Pupils: Pupils are equal, round, and reactive to light. Neck:      Musculoskeletal: Normal range of motion. Cardiovascular:      Rate and Rhythm: Normal rate and regular rhythm. Pulses: Normal pulses. Heart sounds: Normal heart sounds.    Pulmonary:      Effort: Pulmonary effort is normal. No respiratory distress. Breath sounds: Normal breath sounds. No wheezing, rhonchi or rales. Musculoskeletal: Normal range of motion. General: Swelling, tenderness and signs of injury present. Comments: Patient has tenderness to the base of the right fifth metatarsal.  Has swelling in this area, no contusion, skin is intact. posterior tibialis and dorsalis pedis pulses are intact   Skin:     General: Skin is warm. Capillary Refill: Capillary refill takes 2 to 3 seconds. Coloration: Skin is not jaundiced or pale. Findings: No bruising, erythema, lesion or rash. Neurological:      General: No focal deficit present. Mental Status: She is alert and oriented to person, place, and time. Sensory: No sensory deficit. Motor: No weakness. Coordination: Coordination normal.      Deep Tendon Reflexes: Reflexes normal.   Psychiatric:         Mood and Affect: Mood normal.         Behavior: Behavior normal.          MDM  Fracture, dislocation, ligamentous injury, muscle strain, muscle spasm, spinal injury    Patient is a well-appearing 68-year-old female presenting today with fracture of the's of the fifth metatarsal.  The area is aligned and skin is intact. She has tenderness to the right and left of the thoracic spine, without spinal tenderness. She is receiving splint and crutches with follow-up for Ortho. Patient has received a protective splint to immobilize the foot. she has good dorsalis pedis pulse, toe movement and capillary refill of the foot after splint. She has been instructed to not bear any weight on this extremity. She has also been instructed on how to check for capillary refill, warning signs and symptoms of compartment syndrome or further injury.   .me  6:20 PM        Procedures

## 2020-07-20 NOTE — LETTER
MinhTeri Nona 53 
[address] [facility] [address] [id number] [phone] Work/School Note Date: 7/20/2020 To Whom It May concern: 
 
Robb Bowers was seen and treated today in the emergency room by the following provider(s): 
Attending Provider: Raghavendra Constantino MD 
Physician Assistant: Armen Smith PA-C. Robb Bowers may return to work on she has been evaluated by orthopedist and cleared by orthopedist to return to work. Patient is not to bear any weight on this extremity directed by orthopedist. 
 
Sincerely, Dang Cazares PA-C

## 2020-07-20 NOTE — DISCHARGE INSTRUCTIONS
Do not bear any weight on your right foot, until you have been authorized to do so by the orthopedist.  Please keep the Ortho boot on to protect your foot from further injury.

## 2021-05-10 ENCOUNTER — APPOINTMENT (OUTPATIENT)
Dept: GENERAL RADIOLOGY | Age: 31
End: 2021-05-10
Attending: EMERGENCY MEDICINE
Payer: MEDICAID

## 2021-05-10 ENCOUNTER — HOSPITAL ENCOUNTER (EMERGENCY)
Age: 31
Discharge: HOME | End: 2021-05-10
Attending: EMERGENCY MEDICINE
Payer: MEDICAID

## 2021-05-10 VITALS
DIASTOLIC BLOOD PRESSURE: 80 MMHG | BODY MASS INDEX: 25.27 KG/M2 | HEART RATE: 79 BPM | WEIGHT: 161 LBS | RESPIRATION RATE: 12 BRPM | TEMPERATURE: 98.6 F | OXYGEN SATURATION: 100 % | SYSTOLIC BLOOD PRESSURE: 132 MMHG | HEIGHT: 67 IN

## 2021-05-10 DIAGNOSIS — M54.2 NECK PAIN: ICD-10-CM

## 2021-05-10 DIAGNOSIS — R94.31 ABNORMAL EKG: ICD-10-CM

## 2021-05-10 DIAGNOSIS — R07.89 CHEST WALL PAIN: Primary | ICD-10-CM

## 2021-05-10 LAB
ALBUMIN SERPL-MCNC: 3.8 G/DL (ref 3.5–5)
ALBUMIN/GLOB SERPL: 1 {RATIO} (ref 1.1–2.2)
ALP SERPL-CCNC: 69 U/L (ref 45–117)
ALT SERPL-CCNC: 25 U/L (ref 12–78)
ANION GAP SERPL CALC-SCNC: 8 MMOL/L (ref 5–15)
AST SERPL-CCNC: 25 U/L (ref 15–37)
ATRIAL RATE: 92 BPM
BASOPHILS # BLD: 0 K/UL (ref 0–0.1)
BASOPHILS NFR BLD: 1 % (ref 0–1)
BILIRUB SERPL-MCNC: 1 MG/DL (ref 0.2–1)
BUN SERPL-MCNC: 8 MG/DL (ref 6–20)
BUN/CREAT SERPL: 12 (ref 12–20)
CALCIUM SERPL-MCNC: 8.3 MG/DL (ref 8.5–10.1)
CALCULATED P AXIS, ECG09: 34 DEGREES
CALCULATED R AXIS, ECG10: 54 DEGREES
CALCULATED T AXIS, ECG11: 16 DEGREES
CHLORIDE SERPL-SCNC: 107 MMOL/L (ref 97–108)
CO2 SERPL-SCNC: 22 MMOL/L (ref 21–32)
COMMENT, HOLDF: NORMAL
CREAT SERPL-MCNC: 0.66 MG/DL (ref 0.55–1.02)
D DIMER PPP FEU-MCNC: <0.19 MG/L FEU (ref 0–0.65)
DIAGNOSIS, 93000: NORMAL
DIFFERENTIAL METHOD BLD: NORMAL
EOSINOPHIL # BLD: 0.2 K/UL (ref 0–0.4)
EOSINOPHIL NFR BLD: 3 % (ref 0–7)
ERYTHROCYTE [DISTWIDTH] IN BLOOD BY AUTOMATED COUNT: 12.3 % (ref 11.5–14.5)
GLOBULIN SER CALC-MCNC: 3.8 G/DL (ref 2–4)
GLUCOSE SERPL-MCNC: 89 MG/DL (ref 65–100)
HCG UR QL: NEGATIVE
HCT VFR BLD AUTO: 39.3 % (ref 35–47)
HGB BLD-MCNC: 13.7 G/DL (ref 11.5–16)
IMM GRANULOCYTES # BLD AUTO: 0 K/UL (ref 0–0.04)
IMM GRANULOCYTES NFR BLD AUTO: 0 % (ref 0–0.5)
LYMPHOCYTES # BLD: 1.9 K/UL (ref 0.8–3.5)
LYMPHOCYTES NFR BLD: 41 % (ref 12–49)
MCH RBC QN AUTO: 32.2 PG (ref 26–34)
MCHC RBC AUTO-ENTMCNC: 34.9 G/DL (ref 30–36.5)
MCV RBC AUTO: 92.3 FL (ref 80–99)
MONOCYTES # BLD: 0.6 K/UL (ref 0–1)
MONOCYTES NFR BLD: 13 % (ref 5–13)
NEUTS SEG # BLD: 2 K/UL (ref 1.8–8)
NEUTS SEG NFR BLD: 42 % (ref 32–75)
NRBC # BLD: 0 K/UL (ref 0–0.01)
NRBC BLD-RTO: 0 PER 100 WBC
P-R INTERVAL, ECG05: 144 MS
PLATELET # BLD AUTO: 304 K/UL (ref 150–400)
PMV BLD AUTO: 9.3 FL (ref 8.9–12.9)
POTASSIUM SERPL-SCNC: 3.5 MMOL/L (ref 3.5–5.1)
PROT SERPL-MCNC: 7.6 G/DL (ref 6.4–8.2)
Q-T INTERVAL, ECG07: 374 MS
QRS DURATION, ECG06: 82 MS
QTC CALCULATION (BEZET), ECG08: 462 MS
RBC # BLD AUTO: 4.26 M/UL (ref 3.8–5.2)
SAMPLES BEING HELD,HOLD: NORMAL
SODIUM SERPL-SCNC: 137 MMOL/L (ref 136–145)
TROPONIN I SERPL-MCNC: <0.05 NG/ML
VENTRICULAR RATE, ECG03: 92 BPM
WBC # BLD AUTO: 4.7 K/UL (ref 3.6–11)

## 2021-05-10 PROCEDURE — 73030 X-RAY EXAM OF SHOULDER: CPT

## 2021-05-10 PROCEDURE — 74011250636 HC RX REV CODE- 250/636: Performed by: EMERGENCY MEDICINE

## 2021-05-10 PROCEDURE — 96375 TX/PRO/DX INJ NEW DRUG ADDON: CPT

## 2021-05-10 PROCEDURE — 85379 FIBRIN DEGRADATION QUANT: CPT

## 2021-05-10 PROCEDURE — 36415 COLL VENOUS BLD VENIPUNCTURE: CPT

## 2021-05-10 PROCEDURE — 99284 EMERGENCY DEPT VISIT MOD MDM: CPT

## 2021-05-10 PROCEDURE — 80053 COMPREHEN METABOLIC PANEL: CPT

## 2021-05-10 PROCEDURE — 93005 ELECTROCARDIOGRAM TRACING: CPT

## 2021-05-10 PROCEDURE — 81025 URINE PREGNANCY TEST: CPT

## 2021-05-10 PROCEDURE — 85025 COMPLETE CBC W/AUTO DIFF WBC: CPT

## 2021-05-10 PROCEDURE — 71046 X-RAY EXAM CHEST 2 VIEWS: CPT

## 2021-05-10 PROCEDURE — 96374 THER/PROPH/DIAG INJ IV PUSH: CPT

## 2021-05-10 PROCEDURE — 84484 ASSAY OF TROPONIN QUANT: CPT

## 2021-05-10 PROCEDURE — 72050 X-RAY EXAM NECK SPINE 4/5VWS: CPT

## 2021-05-10 RX ORDER — ONDANSETRON 2 MG/ML
8 INJECTION INTRAMUSCULAR; INTRAVENOUS
Status: COMPLETED | OUTPATIENT
Start: 2021-05-10 | End: 2021-05-10

## 2021-05-10 RX ORDER — KETOROLAC TROMETHAMINE 30 MG/ML
30 INJECTION, SOLUTION INTRAMUSCULAR; INTRAVENOUS
Status: COMPLETED | OUTPATIENT
Start: 2021-05-10 | End: 2021-05-10

## 2021-05-10 RX ORDER — CYCLOBENZAPRINE HCL 10 MG
10 TABLET ORAL
Qty: 15 TAB | Refills: 0 | Status: SHIPPED | OUTPATIENT
Start: 2021-05-10 | End: 2021-06-05

## 2021-05-10 RX ORDER — IBUPROFEN 800 MG/1
800 TABLET ORAL
Qty: 20 TAB | Refills: 0 | Status: SHIPPED | OUTPATIENT
Start: 2021-05-10 | End: 2021-05-17

## 2021-05-10 RX ADMIN — KETOROLAC TROMETHAMINE 30 MG: 30 INJECTION, SOLUTION INTRAMUSCULAR; INTRAVENOUS at 06:17

## 2021-05-10 RX ADMIN — ONDANSETRON 8 MG: 2 INJECTION INTRAMUSCULAR; INTRAVENOUS at 06:17

## 2021-05-10 NOTE — ED TRIAGE NOTES
Pt arrives ambulatory to triage with c/o chest pain, nausea and arm pain. She states she was laying in bed when the pain started last night.  She has been having episodes with this pain for the last month but tonight is the worst.

## 2021-05-10 NOTE — Clinical Note
Debora Castellano 53 
[address] [facility] [phone] [phone] Work/School Note Date: 5/10/2021 To Whom It May concern: 
 
Adarsh Ornelas was seen and treated today in the emergency room by the following provider(s): 
Attending Provider: Theresa Edwards MD. Adarsh Ornelas is excused from work/school on 5/10/2021 through 5/13/2021. She is medically clear to return to work/school on 5/14/2021.   
  
 
Sincerely, 
 
 
 
 
Eden Aguillon MD

## 2021-05-10 NOTE — ED PROVIDER NOTES
HPI     75-year-old female without significant past medical history presents the emergency department complaining of left-sided neck arm and chest pain. She states is been happening intermittently for the past month. Tonight it woke her from sleep around midnight with nausea and shortness of breath. She denies feeling sweaty. She states is worse to move. She has noticed symptoms coming on when she exercises. She states is an 8 out of 10. Patient denies history of hypertension, diabetes, high cholesterol, or family history of heart disease. She does smoke and is on birth control pills. She denies any calf pain or swelling. She believes her grandmother had blood clots. She denies a fever cough sore throat diarrhea or any known COVID-19 symptoms or exposure. She has not been vaccinated. She did not take anything for her symptoms prior to coming. She denies any specific injury. 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 No past medical history on file. No past surgical history on file. No family history on file.     Social History     Socioeconomic History    Marital status: UNKNOWN     Spouse name: Not on file    Number of children: Not on file    Years of education: Not on file    Highest education level: Not on file   Occupational History    Not on file   Social Needs    Financial resource strain: Not on file    Food insecurity     Worry: Not on file     Inability: Not on file    Transportation needs     Medical: Not on file     Non-medical: Not on file   Tobacco Use    Smoking status: Former Smoker     Packs/day: 1.00    Smokeless tobacco: Never Used   Substance and Sexual Activity    Alcohol use: Yes     Comment: \"every other day\"    Drug use: Not Currently     Types: Marijuana     Comment: occ    Sexual activity: Never   Lifestyle    Physical activity     Days per week: Not on file     Minutes per session: Not on file    Stress: Not on file   Relationships    Social connections     Talks on phone: Not on file     Gets together: Not on file     Attends Jewish service: Not on file     Active member of club or organization: Not on file     Attends meetings of clubs or organizations: Not on file     Relationship status: Not on file    Intimate partner violence     Fear of current or ex partner: Not on file     Emotionally abused: Not on file     Physically abused: Not on file     Forced sexual activity: Not on file   Other Topics Concern    Not on file   Social History Narrative    Not on file         ALLERGIES: Patient has no known allergies. Review of Systems   Constitutional: Negative for fever. HENT: Negative for congestion. Eyes: Negative for visual disturbance. Respiratory: Positive for shortness of breath. Negative for cough. Cardiovascular: Positive for chest pain. Negative for leg swelling. Gastrointestinal: Positive for nausea. Negative for abdominal pain and vomiting. Endocrine: Negative for polyuria. Genitourinary: Negative for dysuria. Musculoskeletal: Negative for gait problem. Neurological: Negative for headaches. Psychiatric/Behavioral: Negative for dysphoric mood. Vitals:    05/10/21 0533   BP: (!) 144/102   Pulse: 79   Resp: 16   Temp: 98.4 °F (36.9 °C)   SpO2: 100%   Weight: 73 kg (161 lb)   Height: 5' 7\" (1.702 m)            Physical Exam  Constitutional:       General: She is not in acute distress. Appearance: She is well-developed. HENT:      Head: Normocephalic and atraumatic. Mouth/Throat:      Pharynx: No oropharyngeal exudate. Eyes:      General: No scleral icterus. Right eye: No discharge. Left eye: No discharge. Pupils: Pupils are equal, round, and reactive to light. Neck:      Musculoskeletal: Normal range of motion and neck supple. Vascular: No JVD. Cardiovascular:      Rate and Rhythm: Normal rate and regular rhythm. Heart sounds: Normal heart sounds. No murmur. Pulmonary:      Effort: Pulmonary effort is normal. No respiratory distress. Breath sounds: Normal breath sounds. No stridor. No wheezing or rales. Chest:      Chest wall: Tenderness present. Abdominal:      General: Bowel sounds are normal. There is no distension. Palpations: Abdomen is soft. There is no mass. Tenderness: There is no abdominal tenderness. There is no guarding or rebound. Musculoskeletal: Normal range of motion. Comments: Tenderness to cervical spine, paracervical muscles on left   Skin:     General: Skin is warm and dry. Capillary Refill: Capillary refill takes less than 2 seconds. Findings: No rash. Neurological:      Mental Status: She is oriented to person, place, and time. Psychiatric:         Behavior: Behavior normal.         Thought Content: Thought content normal.         Judgment: Judgment normal.          MDM       Procedures      ED EKG interpretation:  Rhythm: normal sinus rhythm; and regular . Rate (approx.): 92; Axis: normal; P wave: normal; QRS interval: normal ; ST/T wave: non-specific changes;  T wave inversions anterior leads This EKG was interpreted by Kevan Isidro MD,ED Provider. Work-up is reassuring. Pain has significantly improved with Toradol. patient states her pain started at 12 midnight and was constant. With a negative troponin and reproducible pain I do not think this is cardiac in nature. She does have flipped anterior T waves however and I do not have an old one to compare to. She has no cardiac risk factors. I feel she safe to go home follow-up with cardiology as an outpatient. I will prescribe NSAIDs and Flexeril for her muscular pain. Turn precautions provided.

## 2021-05-10 NOTE — DISCHARGE INSTRUCTIONS
Work-up in the emergency department was reassuring. Your pain improved with anti-inflammatory pain medication. There is no sign of a heart attack or blood clot and your x-rays were normal.  You did have a slightly abnormal EKG so I would like you to follow-up with cardiology. When you call the office this morning be sure to tell them you are in the emergency room with chest pain and they close follow-up. Take the ibuprofen every 8 hours with food and the Flexeril every 8 hours for muscle pain. You should also follow-up with primary care if symptoms are not improving this week. If you feel your symptoms are getting acutely worse return to the emergency department.

## 2021-06-04 ENCOUNTER — HOSPITAL ENCOUNTER (OUTPATIENT)
Age: 31
Setting detail: OBSERVATION
Discharge: LEFT BEFORE DISCHARGE | End: 2021-06-05
Attending: EMERGENCY MEDICINE | Admitting: STUDENT IN AN ORGANIZED HEALTH CARE EDUCATION/TRAINING PROGRAM
Payer: MEDICAID

## 2021-06-04 ENCOUNTER — APPOINTMENT (OUTPATIENT)
Dept: CT IMAGING | Age: 31
End: 2021-06-04
Attending: EMERGENCY MEDICINE
Payer: MEDICAID

## 2021-06-04 DIAGNOSIS — R41.82 ALTERED MENTAL STATUS, UNSPECIFIED ALTERED MENTAL STATUS TYPE: ICD-10-CM

## 2021-06-04 DIAGNOSIS — T50.904A: Primary | ICD-10-CM

## 2021-06-04 LAB
ALBUMIN SERPL-MCNC: 3 G/DL (ref 3.5–5)
ALBUMIN/GLOB SERPL: 0.9 {RATIO} (ref 1.1–2.2)
ALP SERPL-CCNC: 68 U/L (ref 45–117)
ALT SERPL-CCNC: 19 U/L (ref 12–78)
AMPHET UR QL SCN: NEGATIVE
ANION GAP SERPL CALC-SCNC: 10 MMOL/L (ref 5–15)
ANION GAP SERPL CALC-SCNC: 12 MMOL/L (ref 5–15)
APAP SERPL-MCNC: <2 UG/ML (ref 10–30)
APPEARANCE UR: CLEAR
AST SERPL-CCNC: 14 U/L (ref 15–37)
BACTERIA URNS QL MICRO: NEGATIVE /HPF
BARBITURATES UR QL SCN: NEGATIVE
BASOPHILS # BLD: 0.1 K/UL (ref 0–0.1)
BASOPHILS NFR BLD: 1 % (ref 0–1)
BENZODIAZ UR QL: NEGATIVE
BILIRUB SERPL-MCNC: 0.2 MG/DL (ref 0.2–1)
BILIRUB UR QL: NEGATIVE
BUN SERPL-MCNC: 13 MG/DL (ref 6–20)
BUN SERPL-MCNC: 14 MG/DL (ref 6–20)
BUN/CREAT SERPL: 18 (ref 12–20)
BUN/CREAT SERPL: 19 (ref 12–20)
CALCIUM SERPL-MCNC: 7.7 MG/DL (ref 8.5–10.1)
CALCIUM SERPL-MCNC: 8.4 MG/DL (ref 8.5–10.1)
CANNABINOIDS UR QL SCN: NEGATIVE
CHLORIDE SERPL-SCNC: 105 MMOL/L (ref 97–108)
CHLORIDE SERPL-SCNC: 108 MMOL/L (ref 97–108)
CO2 SERPL-SCNC: 23 MMOL/L (ref 21–32)
CO2 SERPL-SCNC: 23 MMOL/L (ref 21–32)
COCAINE UR QL SCN: NEGATIVE
COLOR UR: ABNORMAL
CREAT SERPL-MCNC: 0.71 MG/DL (ref 0.55–1.02)
CREAT SERPL-MCNC: 0.75 MG/DL (ref 0.55–1.02)
DIFFERENTIAL METHOD BLD: NORMAL
DRUG SCRN COMMENT,DRGCM: NORMAL
EOSINOPHIL # BLD: 0.2 K/UL (ref 0–0.4)
EOSINOPHIL NFR BLD: 3 % (ref 0–7)
EPITH CASTS URNS QL MICRO: ABNORMAL /LPF
ERYTHROCYTE [DISTWIDTH] IN BLOOD BY AUTOMATED COUNT: 13.2 % (ref 11.5–14.5)
ETHANOL SERPL-MCNC: <10 MG/DL
GLOBULIN SER CALC-MCNC: 3.4 G/DL (ref 2–4)
GLUCOSE SERPL-MCNC: 84 MG/DL (ref 65–100)
GLUCOSE SERPL-MCNC: 87 MG/DL (ref 65–100)
GLUCOSE UR STRIP.AUTO-MCNC: NEGATIVE MG/DL
HCG SERPL QL: NEGATIVE
HCT VFR BLD AUTO: 38.6 % (ref 35–47)
HGB BLD-MCNC: 13.2 G/DL (ref 11.5–16)
HGB UR QL STRIP: NEGATIVE
IMM GRANULOCYTES # BLD AUTO: 0 K/UL (ref 0–0.04)
IMM GRANULOCYTES NFR BLD AUTO: 0 % (ref 0–0.5)
KETONES UR QL STRIP.AUTO: NEGATIVE MG/DL
LEUKOCYTE ESTERASE UR QL STRIP.AUTO: ABNORMAL
LYMPHOCYTES # BLD: 1.8 K/UL (ref 0.8–3.5)
LYMPHOCYTES NFR BLD: 32 % (ref 12–49)
MCH RBC QN AUTO: 32.6 PG (ref 26–34)
MCHC RBC AUTO-ENTMCNC: 34.2 G/DL (ref 30–36.5)
MCV RBC AUTO: 95.3 FL (ref 80–99)
METHADONE UR QL: NEGATIVE
MONOCYTES # BLD: 0.5 K/UL (ref 0–1)
MONOCYTES NFR BLD: 9 % (ref 5–13)
NEUTS SEG # BLD: 3.1 K/UL (ref 1.8–8)
NEUTS SEG NFR BLD: 55 % (ref 32–75)
NITRITE UR QL STRIP.AUTO: NEGATIVE
NRBC # BLD: 0 K/UL (ref 0–0.01)
NRBC BLD-RTO: 0 PER 100 WBC
OPIATES UR QL: NEGATIVE
PCP UR QL: NEGATIVE
PH UR STRIP: 7 [PH] (ref 5–8)
PLATELET # BLD AUTO: 302 K/UL (ref 150–400)
PMV BLD AUTO: 9.7 FL (ref 8.9–12.9)
POTASSIUM SERPL-SCNC: 3.6 MMOL/L (ref 3.5–5.1)
POTASSIUM SERPL-SCNC: 4 MMOL/L (ref 3.5–5.1)
PROT SERPL-MCNC: 6.4 G/DL (ref 6.4–8.2)
PROT UR STRIP-MCNC: NEGATIVE MG/DL
RBC # BLD AUTO: 4.05 M/UL (ref 3.8–5.2)
RBC #/AREA URNS HPF: ABNORMAL /HPF (ref 0–5)
SALICYLATES SERPL-MCNC: <1.7 MG/DL (ref 2.8–20)
SODIUM SERPL-SCNC: 140 MMOL/L (ref 136–145)
SODIUM SERPL-SCNC: 141 MMOL/L (ref 136–145)
SP GR UR REFRACTOMETRY: 1.02 (ref 1–1.03)
UA: UC IF INDICATED,UAUC: ABNORMAL
UROBILINOGEN UR QL STRIP.AUTO: 1 EU/DL (ref 0.2–1)
WBC # BLD AUTO: 5.5 K/UL (ref 3.6–11)
WBC URNS QL MICRO: ABNORMAL /HPF (ref 0–4)

## 2021-06-04 PROCEDURE — 95819 EEG AWAKE AND ASLEEP: CPT | Performed by: PSYCHIATRY & NEUROLOGY

## 2021-06-04 PROCEDURE — 80179 DRUG ASSAY SALICYLATE: CPT

## 2021-06-04 PROCEDURE — 99218 HC RM OBSERVATION: CPT

## 2021-06-04 PROCEDURE — 96361 HYDRATE IV INFUSION ADD-ON: CPT

## 2021-06-04 PROCEDURE — 70450 CT HEAD/BRAIN W/O DYE: CPT

## 2021-06-04 PROCEDURE — 93005 ELECTROCARDIOGRAM TRACING: CPT

## 2021-06-04 PROCEDURE — 99285 EMERGENCY DEPT VISIT HI MDM: CPT

## 2021-06-04 PROCEDURE — 80053 COMPREHEN METABOLIC PANEL: CPT

## 2021-06-04 PROCEDURE — 95816 EEG AWAKE AND DROWSY: CPT | Performed by: STUDENT IN AN ORGANIZED HEALTH CARE EDUCATION/TRAINING PROGRAM

## 2021-06-04 PROCEDURE — 36415 COLL VENOUS BLD VENIPUNCTURE: CPT

## 2021-06-04 PROCEDURE — 80307 DRUG TEST PRSMV CHEM ANLYZR: CPT

## 2021-06-04 PROCEDURE — 82077 ASSAY SPEC XCP UR&BREATH IA: CPT

## 2021-06-04 PROCEDURE — 81001 URINALYSIS AUTO W/SCOPE: CPT

## 2021-06-04 PROCEDURE — 84703 CHORIONIC GONADOTROPIN ASSAY: CPT

## 2021-06-04 PROCEDURE — 85025 COMPLETE CBC W/AUTO DIFF WBC: CPT

## 2021-06-04 PROCEDURE — 80143 DRUG ASSAY ACETAMINOPHEN: CPT

## 2021-06-04 PROCEDURE — 74011250636 HC RX REV CODE- 250/636: Performed by: EMERGENCY MEDICINE

## 2021-06-04 PROCEDURE — 2709999900 HC NON-CHARGEABLE SUPPLY

## 2021-06-04 PROCEDURE — 96374 THER/PROPH/DIAG INJ IV PUSH: CPT

## 2021-06-04 PROCEDURE — 74011000258 HC RX REV CODE- 258: Performed by: STUDENT IN AN ORGANIZED HEALTH CARE EDUCATION/TRAINING PROGRAM

## 2021-06-04 RX ORDER — ENOXAPARIN SODIUM 100 MG/ML
40 INJECTION SUBCUTANEOUS DAILY
Status: DISCONTINUED | OUTPATIENT
Start: 2021-06-05 | End: 2021-06-05 | Stop reason: HOSPADM

## 2021-06-04 RX ORDER — NALOXONE HYDROCHLORIDE 1 MG/ML
1 INJECTION INTRAMUSCULAR; INTRAVENOUS; SUBCUTANEOUS
Status: COMPLETED | OUTPATIENT
Start: 2021-06-04 | End: 2021-06-04

## 2021-06-04 RX ORDER — NALOXONE HYDROCHLORIDE 1 MG/ML
INJECTION INTRAMUSCULAR; INTRAVENOUS; SUBCUTANEOUS
Status: DISPENSED
Start: 2021-06-04 | End: 2021-06-05

## 2021-06-04 RX ORDER — ONDANSETRON 4 MG/1
4 TABLET, ORALLY DISINTEGRATING ORAL
Status: DISCONTINUED | OUTPATIENT
Start: 2021-06-04 | End: 2021-06-05 | Stop reason: HOSPADM

## 2021-06-04 RX ORDER — DEXTROSE MONOHYDRATE AND SODIUM CHLORIDE 5; .9 G/100ML; G/100ML
75 INJECTION, SOLUTION INTRAVENOUS CONTINUOUS
Status: DISCONTINUED | OUTPATIENT
Start: 2021-06-04 | End: 2021-06-05 | Stop reason: HOSPADM

## 2021-06-04 RX ORDER — SODIUM CHLORIDE 9 MG/ML
75 INJECTION, SOLUTION INTRAVENOUS CONTINUOUS
Status: DISCONTINUED | OUTPATIENT
Start: 2021-06-04 | End: 2021-06-04

## 2021-06-04 RX ORDER — POLYETHYLENE GLYCOL 3350 17 G/17G
17 POWDER, FOR SOLUTION ORAL DAILY PRN
Status: DISCONTINUED | OUTPATIENT
Start: 2021-06-04 | End: 2021-06-05 | Stop reason: HOSPADM

## 2021-06-04 RX ORDER — ACETAMINOPHEN 325 MG/1
650 TABLET ORAL
Status: DISCONTINUED | OUTPATIENT
Start: 2021-06-04 | End: 2021-06-05 | Stop reason: HOSPADM

## 2021-06-04 RX ORDER — SODIUM CHLORIDE 0.9 % (FLUSH) 0.9 %
5-40 SYRINGE (ML) INJECTION AS NEEDED
Status: DISCONTINUED | OUTPATIENT
Start: 2021-06-04 | End: 2021-06-05 | Stop reason: HOSPADM

## 2021-06-04 RX ORDER — NORETHINDRONE ACETATE AND ETHINYL ESTRADIOL AND FERROUS FUMARATE 1MG-20(24)
1 KIT ORAL DAILY
COMMUNITY
Start: 2021-05-06 | End: 2022-08-25 | Stop reason: ALTCHOICE

## 2021-06-04 RX ORDER — SODIUM CHLORIDE 0.9 % (FLUSH) 0.9 %
5-40 SYRINGE (ML) INJECTION EVERY 8 HOURS
Status: DISCONTINUED | OUTPATIENT
Start: 2021-06-04 | End: 2021-06-05 | Stop reason: HOSPADM

## 2021-06-04 RX ORDER — SERTRALINE HYDROCHLORIDE 50 MG/1
50 TABLET, FILM COATED ORAL DAILY
COMMUNITY
Start: 2021-05-06 | End: 2022-08-25 | Stop reason: ALTCHOICE

## 2021-06-04 RX ORDER — ONDANSETRON 2 MG/ML
4 INJECTION INTRAMUSCULAR; INTRAVENOUS
Status: DISCONTINUED | OUTPATIENT
Start: 2021-06-04 | End: 2021-06-05 | Stop reason: HOSPADM

## 2021-06-04 RX ORDER — ACETAMINOPHEN 650 MG/1
650 SUPPOSITORY RECTAL
Status: DISCONTINUED | OUTPATIENT
Start: 2021-06-04 | End: 2021-06-05 | Stop reason: HOSPADM

## 2021-06-04 RX ADMIN — Medication 10 ML: at 23:12

## 2021-06-04 RX ADMIN — NALOXONE HYDROCHLORIDE 1 MG: 1 INJECTION PARENTERAL at 16:51

## 2021-06-04 RX ADMIN — DEXTROSE AND SODIUM CHLORIDE 75 ML/HR: 5; 900 INJECTION, SOLUTION INTRAVENOUS at 23:11

## 2021-06-04 RX ADMIN — SODIUM CHLORIDE 1000 ML: 9 INJECTION, SOLUTION INTRAVENOUS at 18:33

## 2021-06-04 RX ADMIN — SODIUM CHLORIDE 1000 ML: 9 INJECTION, SOLUTION INTRAVENOUS at 17:15

## 2021-06-04 RX ADMIN — NALOXONE HYDROCHLORIDE 1 MG: 1 INJECTION PARENTERAL at 16:46
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
## 2021-06-04 NOTE — ED NOTES
Hospitalist at bedside, Dr. Latosha Keene, updating patient's  on the plan of care for admission and further testing.
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
## 2021-06-04 NOTE — ED NOTES
Pt's  reports that the pt has a history of seizures. Pt's  states, Alexei De La Fuente fell and hit her head one day and she stopped having them. \"

## 2021-06-04 NOTE — ED NOTES
Bedside shift change report given to Candi Hoover (oncoming nurse) by Slade Harris (offgoing nurse). Report included the following information SBAR, ED Summary, Recent Results and Med Rec Status.

## 2021-06-04 NOTE — BSMART NOTE
BSMART Note    Chart accessed as [facility] consult was placed, however then decision was made to medically admit, so patient was not seen. Patient is currently not able to participate in an assessment due to her mental status. Patient should be seen by inpatient psychiatry on the medical floor.       Misti Vasquez MA
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
## 2021-06-04 NOTE — ED NOTES
Pt's  Goisa Ramos left to take the children to his mother's house, he will be back. I told him that the plan is to admit the patient here at [address] [place] [address]. He may be reached at [phone].

## 2021-06-05 VITALS
RESPIRATION RATE: 23 BRPM | WEIGHT: 166 LBS | HEIGHT: 66 IN | SYSTOLIC BLOOD PRESSURE: 124 MMHG | DIASTOLIC BLOOD PRESSURE: 76 MMHG | TEMPERATURE: 98.2 F | HEART RATE: 87 BPM | BODY MASS INDEX: 26.68 KG/M2 | OXYGEN SATURATION: 100 %

## 2021-06-05 PROCEDURE — 99205 OFFICE O/P NEW HI 60 MIN: CPT | Performed by: PSYCHIATRY & NEUROLOGY

## 2021-06-05 PROCEDURE — 99218 HC RM OBSERVATION: CPT

## 2021-06-05 RX ORDER — LEVETIRACETAM 500 MG/1
500 TABLET ORAL 2 TIMES DAILY
Qty: 60 TABLET | Refills: 0 | Status: SHIPPED | OUTPATIENT
Start: 2021-06-05

## 2021-06-05 RX ADMIN — Medication 10 ML: at 06:38

## 2021-06-05 NOTE — PROGRESS NOTES
26- Patient alert and resting in bed. Patient assisted to bathroom. 0727- Patient alert. Vital signs taken. Patient alert and oritend x4. Patient states she does not remember what happened and has no memory of the night. Patient states wanting to leave AMA. Patient educated on importance of staying for treatment with understanding noted. Tori- MD called to place consult for neuro. Neurologist on floor and seen patient at this time. 0830- Patient discussed AMA with MD in detail. Patient states understanding. PIV removed and AMA papers signed. Patient unstable at discharge and helped by .  was verbally intense about patient leaving now and patient followed lead.

## 2021-06-05 NOTE — ED NOTES
Pt awake and talking to  who is at bedside but is still drowsy.  feels pt is not awake enough to talk to [facility].  Will continue to monitor

## 2021-06-05 NOTE — PROGRESS NOTES
PROCEDURE: ROUTINE INPATIENT EEG  NAME:   Ana Loving  EEG NUMBER: [id number]  ACCOUNT NUMBER : [de-identified]  MRN:   [id number]  DATE OF SERVICE: 6/4/2021    HISTORY/INDICATION:AMS  MEDICATIONS: Se chart   CONDITIONS OF RECORDING: This is a routine 21-channel EEG recording performed in accordance with the international 10-20 system with one channel devoted to limited EKG. This study was done during states of awakefulness. Photic stimulation was performed as activating procedures. In addition, eye movement was recorded. DESCRIPTION:   Upon maximal arousal the posterior dominant rhythm has a frequency of 7-10Hz with an amplitude of 15uV. This activity is symmetric over the bilateral posterior derivations and attenuates with eye opening. Photic stimulation  did not significantly alter the tracing. The patient becomes drowsy, but deeper stages of sleep are not attained . Bernardino Kaur Normal sleep architecture is seen with stage II sleep recognized by the presence of symmetric vertex waves and sleep spindles. Noted was occasional right frontotemporal blunted sharp wave. INTERPRETATION:   The EEG  revealed occasional right frontotemporal blunted sharp wave,this represent epileptogenic focus. Clinical correlation required.         Robbie Lucero MD

## 2021-06-05 NOTE — PROGRESS NOTES
2016 report received from [facility] using SBAR< Results and MAR. Pt to be admitted for observation. 2050 pt arrived to floor with  and daughter at bedside. Pt very lethargic, answers with one word and barely opens eyes. Moves extremities spontaneously. Monitoring started via bedside monitors. Pts  assisted with completion of admission databases. Pt sleeping quietly. 2300 IV fluids of D5 NS started at 75cc/hr. Pt remains lethargic but does open eyes to verbal stimuli but drifts off to sleep almost immediately. 0300 Pt insisting on leaving the hospital repeating that she has to go to work. Calls made to family by patient. Still somewhat confused about her being in the hospital , which hospital and why she is in the hospital. Pt stated she didn't take anything, was just really tired. Pt up to bathroom and voided a large amount of urine. Pt finally returned to bed and settled down to go back to sleep until the Dr. Neyda Larkin come in the morning and release her as she stated she has to go to work. 0515 pt resting quietly with no signs of distress at this time.

## 2021-06-05 NOTE — DISCHARGE SUMMARY
Hospitalist Discharge Summary     Patient ID:  Pardeep Lopez  [id number]  15 y.o.  1990    PCP on record: None    Admit date: 6/4/2021  Discharge date and time: 6/5/2021      Admission Diagnoses: Altered mental status [R41.82]    Discharge Diagnoses: Active Problems:    Altered mental status (6/4/2021)           Hospital Course:     Syncope POA  Altered mental status POA  Seizure POA  Anxiety POA  Due to seizures. UDS negative. CT Head interpreted independently - no acute process noted. No response to 2 mg naloxone. Acetaminophen, salicylate level normal. LFT, RFT normal. EEG was performed on 6/3 but Patient left AMA on 6/4 before EEG could be read. At the time of discharge, patient understood the nature, extent and probable consequences of a proposed health care decisions AND was able to make a rational evaluation of the risks and benefits of a proposed health care decisions as compared with alternatives (including no medical interventions). She verbalized understanding of risks of leaving AMA such as seizures, accident, respiratory arrest, death. EEG was read by Dr Bruce Tripathi and showed epileptiform discharges in left temporal lobe. I have called patient and her  Rishi Melendrez and informed patient of the results and requested her to return to the ER for treatment and further evaluation with MRI as she continued to have altered gait and episodic confusion. Patient refused to return to the ER stating that she will follow up with neurology as outpatient. I have discussed with Dr Bruce Tripathi and prescribed Levetiracetam 500 mg BID in the interim as it is in the best interest of the patient. I also communicated with CM on call Ms Clarice Winchester to call patient with details of Dr. Helen Portillo office so that patient can make an appointment. Patient has been advised not to drive. CONSULTATIONS:  None    Excerpted HPI from H&P of Anabella Estes MD:    33 y.o.    female with PMH of anxiety who presents to ED with c/o syncope. Patient is somnolent, arousable sternal rub, answers her name and , recognizes significant other then falls back asleep mumbling incomprehensibly. Significant other Luquan at bedside with patient's 6year old daughter. Patint works as CNA. He reportedly picked patient up from work and patient was driving the car having a normal conversation with him when she started to become sleepy and car started veering to the side. He had her pull up on the side of the road when she slumped forward on the steering wheel. He did not witness any seizure like activity. Reports that patient has been prescribed flexeril for muscular chest pain in the past month and he thinks she may have takn too many of them. Patient reportedly used to get seizures in the past but then \"she just kind of fell and hit her head and the seizures never happened again\". He does not know of any illicit drug use by patient.     We were asked to admit for work up and evaluation of the above problems.         Hx of anxiety, seizures  ______________________________________________________________________  DISCHARGE SUMMARY/HOSPITAL COURSE:  for full details see H&P, daily progress notes, labs, consult notes. Visit Vitals  /76 (BP 1 Location: Right upper arm, BP Patient Position: At rest)   Pulse 87   Temp 98.2 °F (36.8 °C)   Resp 23   Ht 5' 6\" (1.676 m)   Wt 75.3 kg (166 lb)   SpO2 100%   Breastfeeding Unknown   BMI 26.79 kg/m²       _______________________________________________________________________  Patient seen and examined by me on discharge day. Pertinent Findings:  Gen:    Not in distress  Chest: Clear lungs  CVS:   Regular rhythm. No edema  Abd:  Soft, not distended, not tender  Neuro:  Alert with good insight.   Oriented to person, place, and time   _______________________________________________________________________  DISCHARGE MEDICATIONS:   Discharge Medication List as of 2021  8:54 AM CONTINUE these medications which have NOT CHANGED    Details   sertraline (ZOLOFT) 50 mg tablet Take 50 mg by mouth daily. , Historical Med      Junel Fe 24 1 mg-20 mcg (24)/75 mg (4) tab Take 1 Tablet by mouth daily. , Historical Med, MONISHA      cyclobenzaprine (FLEXERIL) 10 mg tablet Take 1 Tab by mouth three (3) times daily as needed for Muscle Spasm(s). , Print, Disp-15 Tab, R-0      naloxone (NARCAN) 4 mg/actuation nasal spray Use 1 spray intranasally, then discard. Repeat with new spray every 2 min as needed for opioid overdose symptoms, alternating nostrils. , Normal, Disp-1 Each,R-0             My Recommended Diet, Activity, Wound Care, and follow-up labs are listed in the patient's Discharge Insturctions which I have personally completed and reviewed.     _______________________________________________________________________  DISPOSITION:     Home with Family:    Home with HH/PT/OT/RN:    SNF/LTC:    IRVIN:    OTHER: Left AMA        Condition at Discharge:  Stable  _______________________________________________________________________  Follow up with:   PCP : None  Follow-up Information    None             Total time in minutes spent coordinating this discharge (includes going over instructions, follow-up, prescriptions, and preparing report for sign off to her PCP) :  45 minutes    Signed:  Huma Lin MD

## 2021-06-05 NOTE — ED NOTES
TRANSFER - OUT REPORT:    Verbal report given to Lalito Patrick RN (name) on Julien  [facility] for routine progression of care       Report consisted of patients Situation, Background, Assessment and   Recommendations(SBAR). Information from the following report(s) SBAR, Kardex, ED Summary, [facility] and Recent Results was reviewed with the receiving nurse. Lines:   Peripheral IV 06/04/21 Left Forearm (Active)   Site Assessment Clean, dry, & intact 06/04/21 1645   Phlebitis Assessment 0 06/04/21 1645   Infiltration Assessment 0 06/04/21 1645   Dressing Status Clean, dry, & intact 06/04/21 1645   Hub Color/Line Status Pink 06/04/21 1645   Action Taken Blood drawn 06/04/21 1645        Opportunity for questions and clarification was provided.       Patient transported with:   Monitor

## 2021-06-05 NOTE — CONSULTS
NEUROLOGY CONSULTATION    DATE OF CONSULTATION: 6/5/2021    CONSULTED BY:Dr JASON Marie    REASON FOR CONSULT: Altered mental status      HISTORY OF PRESENT ILLNESS  Ana Loving is a 33 y.o. right handed black female with history of seizure disorder, anxiety disorder, depression who presented to the emergency room with altered mental status. According to patient's significant other was at the bedside during the interview, he said that he went to pick patient up from work , patient decided to drive, while driving he noticed that patient was not acting right, he then asked her to pull over. As patient pulled over she passed out and then he took over the driving and brought patient to the hospital.  Patient did not recall any of these, patient is still confused during the time of interview, she continue repeating the same over and over. She thought she was still in the emergency room. Patient however says that she has history of seizure, last seizure was according to patient 1. Patient's  significant other who has been with her for about 3 years, said he has not witnessed a seizure. Patient says he has problem with memory. While talking to the patient, she was eager to go home, she said she wanted to be discharged. I told patient that we need to find out what is going on with her, however was insisting. Her significant other was much helpful  even though patient appeared confused. Patient continues to state that nothing is wrong with her.   ROS   Review of Systems - General ROS: positive for  - fatigue and sleep disturbance  Psychological ROS: positive for - anxiety, concentration difficulties, disorientation, memory difficulties, and sleep disturbances  Ophthalmic ROS: positive for - decreased vision  ENT ROS: positive for - visual changes  Allergy and Immunology ROS: negative  Hematological and Lymphatic ROS: negative  Endocrine ROS: negative  Respiratory ROS: no cough, shortness of breath, or wheezing  Cardiovascular ROS: no chest pain or dyspnea on exertion  Gastrointestinal ROS: no abdominal pain, change in bowel habits, or black or bloody stools  Genito-Urinary ROS: no dysuria, trouble voiding, or hematuria  Musculoskeletal ROS: positive for - joint pain, muscle pain, and muscular weakness  Neurological ROS: positive for - confusion, dizziness, headaches, memory loss, numbness/tingling, seizures, and visual changes  Dermatological ROS: negative         PMH  History reviewed. No pertinent past medical history. SH  Social History     Socioeconomic History    Marital status: UNKNOWN     Spouse name: Not on file    Number of children: Not on file    Years of education: Not on file    Highest education level: Not on file   Tobacco Use    Smoking status: Former Smoker     Packs/day: 1.00    Smokeless tobacco: Never Used   Substance and Sexual Activity    Alcohol use: Yes     Comment: \"every other day\"    Drug use: Not Currently     Types: Marijuana     Comment: occ    Sexual activity: Yes     Partners: Male     Birth control/protection: Pill     Social Determinants of Health     Financial Resource Strain:     Difficulty of Paying Living Expenses:    Food Insecurity:     Worried About Running Out of Food in the Last Year:     Ran Out of Food in the Last Year:    Transportation Needs:     Lack of Transportation (Medical):     Lack of Transportation (Non-Medical):    Physical Activity:     Days of Exercise per Week:     Minutes of Exercise per Session:    Stress:     Feeling of Stress :    Social Connections:     Frequency of Communication with Friends and Family:     Frequency of Social Gatherings with Friends and Family:     Attends [employer]oration[employer] Services: Active Member of Clubs or Organizations:     Attends Club or Organization Meetings:     Marital Status:        FH  History reviewed. No pertinent family history.     ALLERGIES  No Known Allergies    CURRENT MEDS  Current Outpatient Medications Medication Sig Dispense Refill    sertraline (ZOLOFT) 50 mg tablet Take 50 mg by mouth daily. cyclobenzaprine (FLEXERIL) 10 mg tablet Take 1 Tab by mouth three (3) times daily as needed for Muscle Spasm(s). 15 Tab 0    Junel Fe 24 1 mg-20 mcg (24)/75 mg (4) tab Take 1 Tablet by mouth daily. naloxone (NARCAN) 4 mg/actuation nasal spray Use 1 spray intranasally, then discard. Repeat with new spray every 2 min as needed for opioid overdose symptoms, alternating nostrils. 1 Each 0       ROS  As per HPI  CLINICAL DATA REVIEW  IMAGING: (I personally reviewed these images in PACS )        LABS  Recent Results (from the past 48 hour(s))   ETHYL ALCOHOL    Collection Time: 06/04/21  4:47 PM   Result Value Ref Range    ALCOHOL(ETHYL),SERUM <10 <10 MG/DL   CBC WITH AUTOMATED DIFF    Collection Time: 06/04/21  4:47 PM   Result Value Ref Range    WBC 5.5 3.6 - 11.0 K/uL    RBC 4.05 3.80 - 5.20 M/uL    HGB 13.2 11.5 - 16.0 g/dL    HCT 38.6 35.0 - 47.0 %    MCV 95.3 80.0 - 99.0 FL    MCH 32.6 26.0 - 34.0 PG    MCHC 34.2 30.0 - 36.5 g/dL    RDW 13.2 11.5 - 14.5 %    PLATELET 369 467 - 379 K/uL    MPV 9.7 8.9 - 12.9 FL    NRBC 0.0 0  WBC    ABSOLUTE NRBC 0.00 0.00 - 0.01 K/uL    NEUTROPHILS 55 32 - 75 %    LYMPHOCYTES 32 12 - 49 %    MONOCYTES 9 5 - 13 %    EOSINOPHILS 3 0 - 7 %    BASOPHILS 1 0 - 1 %    IMMATURE GRANULOCYTES 0 0.0 - 0.5 %    ABS. NEUTROPHILS 3.1 1.8 - 8.0 K/UL    ABS. LYMPHOCYTES 1.8 0.8 - 3.5 K/UL    ABS. MONOCYTES 0.5 0.0 - 1.0 K/UL    ABS. EOSINOPHILS 0.2 0.0 - 0.4 K/UL    ABS. BASOPHILS 0.1 0.0 - 0.1 K/UL    ABS. IMM.  GRANS. 0.0 0.00 - 0.04 K/UL    DF AUTOMATED     METABOLIC PANEL, BASIC    Collection Time: 06/04/21  4:47 PM   Result Value Ref Range    Sodium 140 136 - 145 mmol/L    Potassium 4.0 3.5 - 5.1 mmol/L    Chloride 105 97 - 108 mmol/L    CO2 23 21 - 32 mmol/L    Anion gap 12 5 - 15 mmol/L    Glucose 84 65 - 100 mg/dL    BUN 14 6 - 20 MG/DL    Creatinine 0.75 0.55 - 1.02 MG/DL BUN/Creatinine ratio 19 12 - 20      GFR est AA >60 >60 ml/min/1.73m2    GFR est non-AA >60 >60 ml/min/1.73m2    Calcium 8.4 (L) 8.5 - 10.1 MG/DL   HCG QL SERUM    Collection Time: 06/04/21  4:47 PM   Result Value Ref Range    HCG, Ql. Negative NEG     ACETAMINOPHEN    Collection Time: 06/04/21  4:47 PM   Result Value Ref Range    Acetaminophen level <2 (L) 10 - 30 ug/mL   SALICYLATE    Collection Time: 06/04/21  4:47 PM   Result Value Ref Range    Salicylate level <0.3 (L) 2.8 - 20.0 MG/DL   DRUG SCREEN, URINE    Collection Time: 06/04/21  5:40 PM   Result Value Ref Range    AMPHETAMINES Negative NEG      BARBITURATES Negative NEG      BENZODIAZEPINES Negative NEG      COCAINE Negative NEG      METHADONE Negative NEG      OPIATES Negative NEG      PCP(PHENCYCLIDINE) Negative NEG      THC (TH-CANNABINOL) Negative NEG      Drug screen comment (NOTE)    URINALYSIS W/ REFLEX CULTURE    Collection Time: 06/04/21  5:40 PM    Specimen: Urine   Result Value Ref Range    Color YELLOW/STRAW      Appearance CLEAR CLEAR      Specific gravity 1.020 1.003 - 1.030      pH (UA) 7.0 5.0 - 8.0      Protein Negative NEG mg/dL    Glucose Negative NEG mg/dL    Ketone Negative NEG mg/dL    Bilirubin Negative NEG      Blood Negative NEG      Urobilinogen 1.0 0.2 - 1.0 EU/dL    Nitrites Negative NEG      Leukocyte Esterase SMALL (A) NEG      WBC 5-10 0 - 4 /hpf    RBC 0-5 0 - 5 /hpf    Epithelial cells FEW FEW /lpf    Bacteria Negative NEG /hpf    UA:UC IF INDICATED CULTURE NOT INDICATED BY UA RESULT CNI     METABOLIC PANEL, COMPREHENSIVE    Collection Time: 06/04/21  6:24 PM   Result Value Ref Range    Sodium 141 136 - 145 mmol/L    Potassium 3.6 3.5 - 5.1 mmol/L    Chloride 108 97 - 108 mmol/L    CO2 23 21 - 32 mmol/L    Anion gap 10 5 - 15 mmol/L    Glucose 87 65 - 100 mg/dL    BUN 13 6 - 20 MG/DL    Creatinine 0.71 0.55 - 1.02 MG/DL    BUN/Creatinine ratio 18 12 - 20      GFR est AA >60 >60 ml/min/1.73m2    GFR est non-AA >60 >60 ml/min/1.73m2    Calcium 7.7 (L) 8.5 - 10.1 MG/DL    Bilirubin, total 0.2 0.2 - 1.0 MG/DL    ALT (SGPT) 19 12 - 78 U/L    AST (SGOT) 14 (L) 15 - 37 U/L    Alk. phosphatase 68 45 - 117 U/L    Protein, total 6.4 6.4 - 8.2 g/dL    Albumin 3.0 (L) 3.5 - 5.0 g/dL    Globulin 3.4 2.0 - 4.0 g/dL    A-G Ratio 0.9 (L) 1.1 - 2.2          PHYSICAL EXAM  Visit Vitals  /76 (BP 1 Location: Right upper arm, BP Patient Position: At rest)   Pulse 87   Temp 98.2 °F (36.8 °C)   Resp 23   Ht 5' 6\" (1.676 m)   Wt 166 lb (75.3 kg)   SpO2 100%   Breastfeeding Unknown   BMI 26.79 kg/m²     General:  Alert, cooperative, no distress. Head:  Normocephalic, without obvious abnormality, atraumatic. Eyes:  Conjunctivae/corneas clear. Pupils equal, round, reactive to light. Extraocular movements intact, VFF, NO papilledema   Lungs:  Heart:   Non labored breathing  Regular rate and rhythm, no carotid bruits   Abdomen:   Soft, non-distended   Extremities: Extremities normal, atraumatic, no cyanosis or edema. Pulses: 2+ and symmetric all extremities. Skin: Skin color, texture, turgor normal. No rashes or lesions.    Neurologic:  Gen: Attention normal             Language: naming, repetition, fluency normal             Memory: intact recent and remote memory  Cranial Nerves:  I: smell Not tested   II: visual fields Full to confrontation   II: pupils Equal, round, reactive to light   II: optic disc No papilledema   III,VII: ptosis none   III,IV,VI: extraocular muscles  Full ROM   V: mastication normal   V: facial light touch sensation  normal   VII: facial muscle function   symmetric   VIII: hearing symmetric   IX: soft palate elevation  normal   XI: trapezius strength  5/5   XI: sternocleidomastoid strength 5/5   XI: neck flexion strength  5/5   XII: tongue  midline     Motor: normal bulk and tone, no tremor              Strength: 5/5 all four extremities  Sensory: intact to LT, PP, vibration, and temperature  Coordination: FTN and HTS intact, Rhomberg negative  Gait: Deferred  Reflexes: 2+   Plantar:[place]       IMPRESSION:  Altered mental status  Seizure disorder  Syncope  Rule out demyelinating disease  Rule out autoimmune disease    RECOMMENDATIONS:  EEG done  MRI of the brain with and without gadolinium  I will start patient on Keppra  Blood for autoimmune work-up, vitamin D, vitamin B12, CK, aldolase, prolactin, vitamin B6, ESR  Seizure precaution  VTE prophylaxis:     Thank you very much for this consultation.         Carmela Guzamn MD

## 2021-06-07 ENCOUNTER — TELEPHONE (OUTPATIENT)
Dept: CASE MANAGEMENT | Age: 31
End: 2021-06-07

## 2021-06-07 LAB
ATRIAL RATE: 91 BPM
CALCULATED P AXIS, ECG09: 35 DEGREES
CALCULATED R AXIS, ECG10: 51 DEGREES
CALCULATED T AXIS, ECG11: 49 DEGREES
DIAGNOSIS, 93000: NORMAL
P-R INTERVAL, ECG05: 164 MS
Q-T INTERVAL, ECG07: 370 MS
QRS DURATION, ECG06: 86 MS
QTC CALCULATION (BEZET), ECG08: 455 MS
VENTRICULAR RATE, ECG03: 91 BPM

## 2021-06-07 NOTE — TELEPHONE ENCOUNTER
Hospital follow up  CM follow up call    CM called patient to follow up after she left AMA from hospital. Patient states she is doing much better than before. CM asked patient who her primary care is. States, \" I see a Dr Michelle Jose, who is an OBGYN at [facility]. She does all my care for me. I will call her if I need to see her. For now I have medications and I should be good. \"     She did request a neurology appt. SAMPSON called Dr Kat Willoughby office to get a new patient appt. Patient is scheduled for 8/9 at [facility]. Called patient back and informed her of the date. No further questions or concerns.      Miranda Chappell RN/[facility]PS[facility]  [phone]

## 2021-11-27 ENCOUNTER — HOSPITAL ENCOUNTER (EMERGENCY)
Dept: CT IMAGING | Age: 31
Discharge: HOME | End: 2021-11-27
Attending: PHYSICIAN ASSISTANT
Payer: MEDICAID

## 2021-11-27 ENCOUNTER — HOSPITAL ENCOUNTER (EMERGENCY)
Age: 31
Discharge: HOME | End: 2021-11-27
Attending: STUDENT IN AN ORGANIZED HEALTH CARE EDUCATION/TRAINING PROGRAM
Payer: MEDICAID

## 2021-11-27 ENCOUNTER — APPOINTMENT (OUTPATIENT)
Dept: GENERAL RADIOLOGY | Age: 31
End: 2021-11-27
Attending: PHYSICIAN ASSISTANT
Payer: MEDICAID

## 2021-11-27 VITALS
HEART RATE: 79 BPM | SYSTOLIC BLOOD PRESSURE: 115 MMHG | RESPIRATION RATE: 15 BRPM | OXYGEN SATURATION: 100 % | TEMPERATURE: 98.9 F | DIASTOLIC BLOOD PRESSURE: 81 MMHG

## 2021-11-27 DIAGNOSIS — M54.9 OTHER ACUTE BACK PAIN: ICD-10-CM
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
## 2021-11-27 LAB — HCG UR QL: NEGATIVE

## 2021-11-27 PROCEDURE — 72072 X-RAY EXAM THORAC SPINE 3VWS: CPT

## 2021-11-27 PROCEDURE — 81025 URINE PREGNANCY TEST: CPT

## 2021-11-27 PROCEDURE — 72100 X-RAY EXAM L-S SPINE 2/3 VWS: CPT

## 2021-11-27 PROCEDURE — 72125 CT NECK SPINE W/O DYE: CPT

## 2021-11-27 PROCEDURE — 74011250636 HC RX REV CODE- 250/636: Performed by: PHYSICIAN ASSISTANT

## 2021-11-27 PROCEDURE — 74011000250 HC RX REV CODE- 250: Performed by: PHYSICIAN ASSISTANT

## 2021-11-27 PROCEDURE — 96372 THER/PROPH/DIAG INJ SC/IM: CPT

## 2021-11-27 PROCEDURE — 99282 EMERGENCY DEPT VISIT SF MDM: CPT

## 2021-11-27 PROCEDURE — 70450 CT HEAD/BRAIN W/O DYE: CPT

## 2021-11-27 PROCEDURE — 73030 X-RAY EXAM OF SHOULDER: CPT

## 2021-11-27 RX ORDER — METHOCARBAMOL 500 MG/1
500 TABLET, FILM COATED ORAL 4 TIMES DAILY
Qty: 12 TABLET | Refills: 0 | Status: SHIPPED | OUTPATIENT
Start: 2021-11-27 | End: 2021-11-27 | Stop reason: SDUPTHER

## 2021-11-27 RX ORDER — METHOCARBAMOL 500 MG/1
500 TABLET, FILM COATED ORAL 4 TIMES DAILY
Qty: 12 TABLET | Refills: 0 | Status: SHIPPED | OUTPATIENT
Start: 2021-11-27 | End: 2021-11-30

## 2021-11-27 RX ORDER — NAPROXEN 500 MG/1
500 TABLET ORAL 2 TIMES DAILY WITH MEALS
Qty: 10 TABLET | Refills: 0 | Status: SHIPPED | OUTPATIENT
Start: 2021-11-27 | End: 2021-11-27 | Stop reason: SDUPTHER

## 2021-11-27 RX ORDER — LIDOCAINE 4 G/100G
1 PATCH TOPICAL EVERY 24 HOURS
Status: DISCONTINUED | OUTPATIENT
Start: 2021-11-27 | End: 2021-11-27 | Stop reason: HOSPADM

## 2021-11-27 RX ORDER — LIDOCAINE 50 MG/G
PATCH TOPICAL
Qty: 15 EACH | Refills: 0 | Status: SHIPPED | OUTPATIENT
Start: 2021-11-27 | End: 2021-11-27 | Stop reason: SDUPTHER

## 2021-11-27 RX ORDER — NAPROXEN 500 MG/1
500 TABLET ORAL 2 TIMES DAILY WITH MEALS
Qty: 10 TABLET | Refills: 0 | Status: SHIPPED | OUTPATIENT
Start: 2021-11-27 | End: 2021-12-02

## 2021-11-27 RX ORDER — KETOROLAC TROMETHAMINE 30 MG/ML
30 INJECTION, SOLUTION INTRAMUSCULAR; INTRAVENOUS
Status: COMPLETED | OUTPATIENT
Start: 2021-11-27 | End: 2021-11-27

## 2021-11-27 RX ORDER — LIDOCAINE 50 MG/G
PATCH TOPICAL
Qty: 15 EACH | Refills: 0 | Status: SHIPPED | OUTPATIENT
Start: 2021-11-27 | End: 2022-08-25 | Stop reason: ALTCHOICE

## 2021-11-27 RX ADMIN — KETOROLAC TROMETHAMINE 30 MG: 30 INJECTION, SOLUTION INTRAMUSCULAR; INTRAVENOUS at 08:39

## 2021-11-27 NOTE — ED TRIAGE NOTES
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 Pt reports she was helping a patient on thanksgiving day when she fell down more than 5 stairs. Pt reports she took some tylenol  For the pain, reports mild relief.      Pt reports back pain, neck pain, headache

## 2021-11-27 NOTE — ED PROVIDER NOTES
Tish Rinne is a 33 y.o. female with PMhx of seizures, on Keppra who presents to ED with cc of 8/10 back pain x 2 days. 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 States she fell backwards and slid down some steps. States she hit the side of her head against the steps. Denies LOC, blood thinner use. Endorses neck pain and back pain. Denies urinary or fecal changes, saddle paresthesias. Endorses a mild headache. Denies abdominal pain, nausea or vomiting. States she has some pain in her shoulder, denies SOB during our discussion. States she had been taking Tylenol for her pain. States she took a muscle relaxer for pain. PMHx: Reviewed, as below. PSHx: Reviewed, as below. PCP: None    There are no other complaints verbalized at this time. History reviewed. No pertinent past medical history. No past surgical history on file. History reviewed. No pertinent family history. Social History     Socioeconomic History    Marital status: UNKNOWN     Spouse name: Not on file    Number of children: Not on file    Years of education: Not on file    Highest education level: Not on file   Occupational History    Not on file   Tobacco Use    Smoking status: Former Smoker     Packs/day: 1.00    Smokeless tobacco: Never Used   Substance and Sexual Activity    Alcohol use: Yes     Comment: \"every other day\"    Drug use: Not Currently     Types: Marijuana     Comment: occ    Sexual activity: Yes     Partners: Male     Birth control/protection: Pill   Other Topics Concern    Not on file   Social History Narrative    Not on file     Social Determinants of Health     Financial Resource Strain:     Difficulty of Paying Living Expenses: Not on file   Food Insecurity:     Worried About Running Out of Food in the Last Year: Not on file    Mahi of Food in the Last Year: Not on file   Transportation Needs:     Lack of Transportation (Medical):  Not on file    Lack of Transportation (Non-Medical): Not on file   Physical Activity:     Days of Exercise per Week: Not on file    Minutes of Exercise per Session: Not on file   Stress:     Feeling of Stress : Not on file   Social Connections:     Frequency of Communication with Friends and Family: Not on file    Frequency of Social Gatherings with Friends and Family: Not on file    Attends [employer] Services: Not on file    Active Member of [address] [place] [address] or Organizations: Not on file    Attends Club or Organization Meetings: Not on file    Marital Status: Not on file   Intimate Partner Violence:     Fear of Current or Ex-Partner: Not on file    Emotionally Abused: Not on file    Physically Abused: Not on file    Sexually Abused: Not on file   Housing Stability:     Unable to Pay for Housing in the Last Year: Not on file    Number of [place] in the Last Year: Not on file    Unstable Housing in the Last Year: Not on file         ALLERGIES: Patient has no known allergies. Review of Systems   Respiratory: Negative for shortness of breath. Cardiovascular: Negative for chest pain. Gastrointestinal: Negative for abdominal pain, constipation, diarrhea, nausea and vomiting. Genitourinary: Negative for dysuria and frequency. Musculoskeletal: Positive for back pain and neck pain. Neurological: Positive for headaches. All other systems reviewed and are negative. Vitals:    11/27/21 0748   BP: 115/81   Pulse: 79   Resp: 15   Temp: 98.9 °F (37.2 °C)   SpO2: 100%            Physical Exam  Vitals and nursing note reviewed. Constitutional:       Appearance: She is not diaphoretic. HENT:      Head: No raccoon eyes or Adame's sign. Jaw: There is normal jaw occlusion. No malocclusion. Comments: No CSF otorrhea or rhinorrhea. No periocular or occipital bruising. Right Ear: Tympanic membrane, ear canal and external ear normal. No drainage. No hemotympanum.       Left Ear: Tympanic membrane, ear canal and external ear normal. No drainage. No hemotympanum. Nose: Nose normal.      Right Nostril: No epistaxis or septal hematoma. Left Nostril: No epistaxis or septal hematoma. Mouth/Throat:      Mouth: Mucous membranes are moist.      Dentition: Normal dentition. Pharynx: Oropharynx is clear. Uvula midline. Eyes:      Extraocular Movements: Extraocular movements intact. Conjunctiva/sclera: Conjunctivae normal.      Pupils: Pupils are equal, round, and reactive to light. Neck:      Trachea: Trachea and phonation normal.   Cardiovascular:      Rate and Rhythm: Normal rate and regular rhythm. Heart sounds: Normal heart sounds. No murmur heard. No friction rub. No gallop. Pulmonary:      Effort: No respiratory distress. Breath sounds: No stridor. No wheezing, rhonchi or rales. Abdominal:      General: Bowel sounds are normal. There is no distension. Palpations: Abdomen is soft. Tenderness: There is no abdominal tenderness. Musculoskeletal:         General: No swelling or deformity. Cervical back: Normal range of motion. No rigidity or bony tenderness. No spinous process tenderness. Normal range of motion. Thoracic back: Tenderness present. Lumbar back: Tenderness present. Comments: 5/5 strength to flexion/extension of hips, knees, ankles. Sensation intact distally. 2+ posterior tibial pulses. Warm extremities. Ambulatory with even gait. Skin:     General: Skin is warm and dry. Capillary Refill: Capillary refill takes less than 2 seconds. Comments: No visible bruising along back. Neurological:      Mental Status: She is alert and oriented to person, place, and time. Mental status is at baseline. GCS: GCS eye subscore is 4. GCS verbal subscore is 5. GCS motor subscore is 6. Cranial Nerves: Cranial nerves are intact. No cranial nerve deficit (2-12), dysarthria or facial asymmetry. Sensory: Sensation is intact.  No sensory deficit (grossly to BLUE and BLLE). Motor: Motor function is intact. No weakness (5/5 strength to biceps, triceps,  strength, to flexion/extension knees and ankles), abnormal muscle tone or seizure activity. Coordination: Finger-Nose-Finger Test and Heel to [employer] normal.      Gait: Gait normal.          MDM  Number of Diagnoses or Management Options     Amount and/or Complexity of Data Reviewed  Tests in the radiology section of CPT®: ordered and reviewed  Discuss the patient with other providers: yes (Dr Kaya Anna, ED attending)           Procedures               VITAL SIGNS:  Vitals:    11/27/21 0748   BP: 115/81   Pulse: 79   Resp: 15   Temp: 98.9 °F (37.2 °C)   SpO2: 100%         LABS:  Recent Results (from the past 24 hour(s))   HCG URINE, QL. - POC    Collection Time: 11/27/21  8:35 AM   Result Value Ref Range    Pregnancy test,urine (POC) Negative NEG           IMAGING:  XR SPINE THORAC 3 V   Final Result   Normal thoracic spine. XR SHOULDER RT AP/LAT MIN 2 V   Final Result   No acute abnormality. XR SPINE LUMB 2 OR 3 V   Final Result   Normal lumbar spine views. CT HEAD WO CONT   Final Result   No acute findings. CT SPINE CERV WO CONT   Final Result   No fracture or other acute findings. Medications During Visit:  Medications   ketorolac (TORADOL) injection 30 mg (30 mg IntraMUSCular Given 11/27/21 0839)         DECISION MAKING:      Lobito Laboy is a 33 y.o. female who comes in as above. Presents afebrile and hemodynamically stable. Presents after fall down some stairs approximately 2 days prior. Reports the fall was mechanical in nature. POC negative for pregnancy. CT head and C-spine without acute findings. X-ray thoracic, lumbar and right shoulder without acute abnormality. She is in VI to bilateral upper and lower extremities. Symptoms concerning for cauda equina syndrome.   We will treat supportively with muscle relaxer as needed, NSAID, lidocaine patches and heat and gentle stretches. I have discussed care with ED attending. Pt and I have discussed close return precautions as well as follow up recommendations. Opportunity for questions presented. Pt verbalizes their understanding and agreement with care plan. IMPRESSION:  1. Fall, initial encounter    2. Other acute back pain        DISPOSITION:  Discharged      Discharge Medication List as of 11/27/2021 10:08 AM      CONTINUE these medications which have CHANGED    Details   lidocaine (LIDODERM) 5 % Apply patch to the affected area for 12 hours a day and remove for 12 hours a day., Normal, Disp-15 Each, R-0      methocarbamoL (Robaxin) 500 mg tablet Take 1 Tablet by mouth four (4) times daily for 3 days. , Normal, Disp-12 Tablet, R-0      naproxen (Naprosyn) 500 mg tablet Take 1 Tablet by mouth two (2) times daily (with meals) for 5 days. , Normal, Disp-10 Tablet, R-0         CONTINUE these medications which have NOT CHANGED    Details   levETIRAcetam (KEPPRA) 500 mg tablet Take 1 Tablet by mouth two (2) times a day., Normal, Disp-60 Tablet, R-0      sertraline (ZOLOFT) 50 mg tablet Take 50 mg by mouth daily. , Historical Med      Junel Fe 24 1 mg-20 mcg (24)/75 mg (4) tab Take 1 Tablet by mouth daily. , Historical Med, MONISHA      naloxone (NARCAN) 4 mg/actuation nasal spray Use 1 spray intranasally, then discard. Repeat with new spray every 2 min as needed for opioid overdose symptoms, alternating nostrils. , Normal, Disp-1 Each,R-0              Follow-up Information     Follow up With Specialties Details Why Contact Info    [address], [facility] Go to  As needed, If symptoms worsen [address] [facility]  [phone]    Please follow up with your PCP  Call               The patient is asked to follow-up with their primary care provider and any other physicians as above.   We have discussed strict return precautions and the patient is asked to return promptly for any increased concerns or worsening of symptoms and for return precautions regarding their symptoms today. They can return to this emergency department or any other emergency department. I have discussed with them results as above and presented opportunity for questions. They verbalize their understanding of the above and agreement with care plan. Please note that this dictation was completed with [employer], the computer voice recognition software. Quite often unanticipated grammatical, syntax, homophones, and other interpretive errors are inadvertently transcribed by the computer software. Please disregard these errors. Please excuse any errors that have escaped final proofreading.

## 2021-11-27 NOTE — DISCHARGE INSTRUCTIONS
Please take muscle relaxer at night. Please do not take with other sedating medications as this can have a sedating side effect. Please do not drink with alcohol. Please use warm compresses to help loosen your muscles.

## 2022-02-09 LAB
CHLAMYDIA, EXTERNAL: NEGATIVE
N. GONORRHEA, EXTERNAL: NEGATIVE

## 2022-02-22 LAB
ANTIBODY SCREEN, EXTERNAL: NEGATIVE
HBSAG, EXTERNAL: NEGATIVE
HIV, EXTERNAL: NON REACTIVE
RPR, EXTERNAL: NON REACTIVE
RUBELLA, EXTERNAL: NORMAL
TYPE, ABO & RH, EXTERNAL: NORMAL

## 2022-03-18 PROBLEM — R41.82 ALTERED MENTAL STATUS: Status: ACTIVE | Noted: 2021-06-04

## 2022-08-25 ENCOUNTER — HOSPITAL ENCOUNTER (INPATIENT)
Age: 32
LOS: 3 days | Discharge: HOME | DRG: 560 | End: 2022-08-28
Attending: OBSTETRICS & GYNECOLOGY | Admitting: OBSTETRICS & GYNECOLOGY
Payer: MEDICAID

## 2022-08-25 PROBLEM — Z87.59 HISTORY OF PRETERM PREMATURE RUPTURE OF MEMBRANES (PPROM): Status: ACTIVE | Noted: 2022-08-25

## 2022-08-25 PROBLEM — Z3A.34 34 WEEKS GESTATION OF PREGNANCY: Status: ACTIVE | Noted: 2022-08-25

## 2022-08-25 LAB
A1 MICROGLOB PLACENTAL VAG QL: POSITIVE
CONTROL LINE PRESENT?: NORMAL
EXPIRATION DATE: NORMAL
INTERNAL NEGATIVE CONTROL: NORMAL
KIT LOT NO.: NORMAL
TYPE, ABO & RH, EXTERNAL: NORMAL

## 2022-08-25 PROCEDURE — 84112 EVAL AMNIOTIC FLUID PROTEIN: CPT | Performed by: ADVANCED PRACTICE MIDWIFE

## 2022-08-25 PROCEDURE — 74011250637 HC RX REV CODE- 250/637: Performed by: ADVANCED PRACTICE MIDWIFE

## 2022-08-25 PROCEDURE — 75810000275 HC EMERGENCY DEPT VISIT NO LEVEL OF CARE

## 2022-08-25 PROCEDURE — 85025 COMPLETE CBC W/AUTO DIFF WBC: CPT

## 2022-08-25 PROCEDURE — 74011250636 HC RX REV CODE- 250/636: Performed by: ADVANCED PRACTICE MIDWIFE

## 2022-08-25 PROCEDURE — 74011000258 HC RX REV CODE- 258: Performed by: ADVANCED PRACTICE MIDWIFE

## 2022-08-25 PROCEDURE — 65270000029 HC RM PRIVATE

## 2022-08-25 RX ORDER — MAG HYDROX/ALUMINUM HYD/SIMETH 200-200-20
30 SUSPENSION, ORAL (FINAL DOSE FORM) ORAL
Status: DISCONTINUED | OUTPATIENT
Start: 2022-08-25 | End: 2022-08-28 | Stop reason: HOSPADM

## 2022-08-25 RX ORDER — FOLIC ACID/MULTIVIT,IRON,MINER 0.4MG-18MG
1 TABLET ORAL DAILY
Status: DISCONTINUED | OUTPATIENT
Start: 2022-08-26 | End: 2022-08-28 | Stop reason: HOSPADM

## 2022-08-25 RX ORDER — ONDANSETRON 2 MG/ML
4 INJECTION INTRAMUSCULAR; INTRAVENOUS
Status: DISCONTINUED | OUTPATIENT
Start: 2022-08-25 | End: 2022-08-28 | Stop reason: HOSPADM

## 2022-08-25 RX ORDER — DOCUSATE SODIUM 100 MG/1
100 CAPSULE, LIQUID FILLED ORAL
Status: DISCONTINUED | OUTPATIENT
Start: 2022-08-25 | End: 2022-08-28 | Stop reason: HOSPADM

## 2022-08-25 RX ORDER — DIPHENHYDRAMINE HCL 25 MG
25 CAPSULE ORAL
Status: DISCONTINUED | OUTPATIENT
Start: 2022-08-25 | End: 2022-08-28 | Stop reason: HOSPADM

## 2022-08-25 RX ORDER — ACETAMINOPHEN 325 MG/1
650 TABLET ORAL
Status: DISCONTINUED | OUTPATIENT
Start: 2022-08-25 | End: 2022-08-28 | Stop reason: HOSPADM

## 2022-08-25 RX ORDER — LANOLIN ALCOHOL/MO/W.PET/CERES
325 CREAM (GRAM) TOPICAL
COMMUNITY

## 2022-08-25 RX ORDER — LANOLIN ALCOHOL/MO/W.PET/CERES
1 CREAM (GRAM) TOPICAL
Status: DISCONTINUED | OUTPATIENT
Start: 2022-08-26 | End: 2022-08-28 | Stop reason: HOSPADM

## 2022-08-25 RX ORDER — SODIUM CHLORIDE 0.9 % (FLUSH) 0.9 %
5-40 SYRINGE (ML) INJECTION AS NEEDED
Status: DISCONTINUED | OUTPATIENT
Start: 2022-08-25 | End: 2022-08-28 | Stop reason: HOSPADM

## 2022-08-25 RX ORDER — AZITHROMYCIN 250 MG/1
1000 TABLET, FILM COATED ORAL
Status: COMPLETED | OUTPATIENT
Start: 2022-08-26 | End: 2022-08-25

## 2022-08-25 RX ORDER — BETAMETHASONE SODIUM PHOSPHATE AND BETAMETHASONE ACETATE 3; 3 MG/ML; MG/ML
12 INJECTION, SUSPENSION INTRA-ARTICULAR; INTRALESIONAL; INTRAMUSCULAR; SOFT TISSUE EVERY 24 HOURS
Status: DISCONTINUED | OUTPATIENT
Start: 2022-08-25 | End: 2022-08-27

## 2022-08-25 RX ORDER — SODIUM CHLORIDE 0.9 % (FLUSH) 0.9 %
5-40 SYRINGE (ML) INJECTION EVERY 8 HOURS
Status: DISCONTINUED | OUTPATIENT
Start: 2022-08-26 | End: 2022-08-28 | Stop reason: HOSPADM

## 2022-08-25 RX ADMIN — BETAMETHASONE SODIUM PHOSPHATE AND BETAMETHASONE ACETATE 12 MG: 3; 3 INJECTION, SUSPENSION INTRA-ARTICULAR; INTRALESIONAL; INTRAMUSCULAR; SOFT TISSUE at 23:59

## 2022-08-25 RX ADMIN — SODIUM CHLORIDE, POTASSIUM CHLORIDE, SODIUM LACTATE AND CALCIUM CHLORIDE 1000 ML: 600; 310; 30; 20 INJECTION, SOLUTION INTRAVENOUS at 22:45

## 2022-08-25 RX ADMIN — DOCUSATE SODIUM 100 MG: 100 CAPSULE, LIQUID FILLED ORAL at 23:48

## 2022-08-25 RX ADMIN — AMPICILLIN SODIUM 2 G: 2 INJECTION, POWDER, FOR SOLUTION INTRAVENOUS at 23:48

## 2022-08-25 RX ADMIN — AZITHROMYCIN DIHYDRATE 1000 MG: 250 TABLET, FILM COATED ORAL at 23:48

## 2022-08-26 PROBLEM — O09.293 PRIOR POOR OBSTETRICAL HISTORY IN THIRD TRIMESTER, ANTEPARTUM: Status: ACTIVE | Noted: 2022-08-26

## 2022-08-26 LAB
ABO + RH BLD: NORMAL
APPEARANCE UR: CLEAR
BACTERIA URNS QL MICRO: NEGATIVE /HPF
BASOPHILS # BLD: 0 K/UL (ref 0–0.1)
BASOPHILS NFR BLD: 1 % (ref 0–1)
BILIRUB UR QL: NEGATIVE
BLOOD GROUP ANTIBODIES SERPL: NORMAL
COLOR UR: NORMAL
DIFFERENTIAL METHOD BLD: ABNORMAL
EOSINOPHIL # BLD: 0.1 K/UL (ref 0–0.4)
EOSINOPHIL NFR BLD: 2 % (ref 0–7)
EPITH CASTS URNS QL MICRO: NORMAL /LPF
ERYTHROCYTE [DISTWIDTH] IN BLOOD BY AUTOMATED COUNT: 12.2 % (ref 11.5–14.5)
GLUCOSE UR STRIP.AUTO-MCNC: NEGATIVE MG/DL
HCT VFR BLD AUTO: 29.1 % (ref 35–47)
HGB BLD-MCNC: 10.6 G/DL (ref 11.5–16)
HGB UR QL STRIP: NEGATIVE
HYALINE CASTS URNS QL MICRO: NORMAL /LPF (ref 0–5)
IMM GRANULOCYTES # BLD AUTO: 0.1 K/UL (ref 0–0.04)
IMM GRANULOCYTES NFR BLD AUTO: 1 % (ref 0–0.5)
KETONES UR QL STRIP.AUTO: NEGATIVE MG/DL
LEUKOCYTE ESTERASE UR QL STRIP.AUTO: NEGATIVE
LYMPHOCYTES # BLD: 1.3 K/UL (ref 0.8–3.5)
LYMPHOCYTES NFR BLD: 22 % (ref 12–49)
MCH RBC QN AUTO: 33.1 PG (ref 26–34)
MCHC RBC AUTO-ENTMCNC: 36.4 G/DL (ref 30–36.5)
MCV RBC AUTO: 90.9 FL (ref 80–99)
MONOCYTES # BLD: 0.7 K/UL (ref 0–1)
MONOCYTES NFR BLD: 13 % (ref 5–13)
NEUTS SEG # BLD: 3.6 K/UL (ref 1.8–8)
NEUTS SEG NFR BLD: 61 % (ref 32–75)
NITRITE UR QL STRIP.AUTO: NEGATIVE
NRBC # BLD: 0 K/UL (ref 0–0.01)
NRBC BLD-RTO: 0 PER 100 WBC
PH UR STRIP: 7 [PH] (ref 5–8)
PLATELET # BLD AUTO: 276 K/UL (ref 150–400)
PMV BLD AUTO: 9.5 FL (ref 8.9–12.9)
PROT UR STRIP-MCNC: NEGATIVE MG/DL
RBC # BLD AUTO: 3.2 M/UL (ref 3.8–5.2)
RBC #/AREA URNS HPF: NORMAL /HPF (ref 0–5)
SP GR UR REFRACTOMETRY: 1.01 (ref 1–1.03)
SPECIMEN EXP DATE BLD: NORMAL
UA: UC IF INDICATED,UAUC: NORMAL
UROBILINOGEN UR QL STRIP.AUTO: 1 EU/DL (ref 0.2–1)
WBC # BLD AUTO: 5.9 K/UL (ref 3.6–11)
WBC URNS QL MICRO: NORMAL /HPF (ref 0–4)

## 2022-08-26 PROCEDURE — 99285 EMERGENCY DEPT VISIT HI MDM: CPT

## 2022-08-26 PROCEDURE — 59025 FETAL NON-STRESS TEST: CPT

## 2022-08-26 PROCEDURE — 74011000258 HC RX REV CODE- 258: Performed by: ADVANCED PRACTICE MIDWIFE

## 2022-08-26 PROCEDURE — 96361 HYDRATE IV INFUSION ADD-ON: CPT

## 2022-08-26 PROCEDURE — 74011000258 HC RX REV CODE- 258: Performed by: OBSTETRICS & GYNECOLOGY

## 2022-08-26 PROCEDURE — 74011250637 HC RX REV CODE- 250/637: Performed by: OBSTETRICS & GYNECOLOGY

## 2022-08-26 PROCEDURE — 74011250636 HC RX REV CODE- 250/636: Performed by: OBSTETRICS & GYNECOLOGY

## 2022-08-26 PROCEDURE — 96360 HYDRATION IV INFUSION INIT: CPT

## 2022-08-26 PROCEDURE — 87081 CULTURE SCREEN ONLY: CPT

## 2022-08-26 PROCEDURE — 81001 URINALYSIS AUTO W/SCOPE: CPT

## 2022-08-26 PROCEDURE — 96372 THER/PROPH/DIAG INJ SC/IM: CPT

## 2022-08-26 PROCEDURE — 86900 BLOOD TYPING SEROLOGIC ABO: CPT

## 2022-08-26 PROCEDURE — 65270000029 HC RM PRIVATE

## 2022-08-26 PROCEDURE — 36415 COLL VENOUS BLD VENIPUNCTURE: CPT

## 2022-08-26 PROCEDURE — 96365 THER/PROPH/DIAG IV INF INIT: CPT

## 2022-08-26 PROCEDURE — 74011250636 HC RX REV CODE- 250/636: Performed by: ADVANCED PRACTICE MIDWIFE

## 2022-08-26 PROCEDURE — 74011250637 HC RX REV CODE- 250/637: Performed by: ADVANCED PRACTICE MIDWIFE

## 2022-08-26 RX ORDER — BETAMETHASONE SODIUM PHOSPHATE AND BETAMETHASONE ACETATE 3; 3 MG/ML; MG/ML
12 INJECTION, SUSPENSION INTRA-ARTICULAR; INTRALESIONAL; INTRAMUSCULAR; SOFT TISSUE ONCE
Status: DISCONTINUED | OUTPATIENT
Start: 2022-08-27 | End: 2022-08-27

## 2022-08-26 RX ORDER — SODIUM CHLORIDE 0.9 % (FLUSH) 0.9 %
5-40 SYRINGE (ML) INJECTION EVERY 8 HOURS
Status: DISCONTINUED | OUTPATIENT
Start: 2022-08-26 | End: 2022-08-28 | Stop reason: HOSPADM

## 2022-08-26 RX ORDER — OXYTOCIN/RINGER'S LACTATE 30/500 ML
0-20 PLASTIC BAG, INJECTION (ML) INTRAVENOUS
Status: DISCONTINUED | OUTPATIENT
Start: 2022-08-26 | End: 2022-08-26

## 2022-08-26 RX ORDER — SODIUM CHLORIDE 0.9 % (FLUSH) 0.9 %
5-40 SYRINGE (ML) INJECTION AS NEEDED
Status: DISCONTINUED | OUTPATIENT
Start: 2022-08-26 | End: 2022-08-28 | Stop reason: HOSPADM

## 2022-08-26 RX ORDER — ZOLPIDEM TARTRATE 5 MG/1
5 TABLET ORAL
Status: DISCONTINUED | OUTPATIENT
Start: 2022-08-26 | End: 2022-08-28 | Stop reason: HOSPADM

## 2022-08-26 RX ORDER — NALBUPHINE HYDROCHLORIDE 20 MG/ML
10 INJECTION, SOLUTION INTRAMUSCULAR; INTRAVENOUS; SUBCUTANEOUS
Status: DISCONTINUED | OUTPATIENT
Start: 2022-08-26 | End: 2022-08-28 | Stop reason: HOSPADM

## 2022-08-26 RX ORDER — ONDANSETRON 4 MG/1
4 TABLET, ORALLY DISINTEGRATING ORAL
Status: DISCONTINUED | OUTPATIENT
Start: 2022-08-26 | End: 2022-08-27 | Stop reason: HOSPADM

## 2022-08-26 RX ADMIN — AMPICILLIN SODIUM 2 G: 2 INJECTION, POWDER, FOR SOLUTION INTRAVENOUS at 06:15

## 2022-08-26 RX ADMIN — Medication 1 TABLET: at 09:11

## 2022-08-26 RX ADMIN — FERROUS SULFATE TAB 325 MG (65 MG ELEMENTAL FE) 325 MG: 325 (65 FE) TAB at 09:11

## 2022-08-26 RX ADMIN — ACETAMINOPHEN 650 MG: 325 TABLET ORAL at 01:09

## 2022-08-26 RX ADMIN — AMPICILLIN SODIUM 2 G: 2 INJECTION, POWDER, FOR SOLUTION INTRAVENOUS at 13:29

## 2022-08-26 RX ADMIN — ZOLPIDEM TARTRATE 5 MG: 5 TABLET ORAL at 04:38

## 2022-08-26 RX ADMIN — DOCUSATE SODIUM 100 MG: 100 CAPSULE, LIQUID FILLED ORAL at 11:50

## 2022-08-26 RX ADMIN — NALBUPHINE HYDROCHLORIDE 10 MG: 20 INJECTION, SOLUTION INTRAMUSCULAR; INTRAVENOUS; SUBCUTANEOUS at 22:55

## 2022-08-26 RX ADMIN — AMPICILLIN SODIUM 2 G: 2 INJECTION, POWDER, FOR SOLUTION INTRAVENOUS at 18:40

## 2022-08-26 RX ADMIN — PROMETHAZINE HYDROCHLORIDE 25 MG: 25 INJECTION INTRAMUSCULAR; INTRAVENOUS at 23:06

## 2022-08-26 RX ADMIN — Medication 50 MCG: at 18:35

## 2022-08-26 RX ADMIN — ALUMINUM HYDROXIDE, MAGNESIUM HYDROXIDE, AND SIMETHICONE 30 ML: 200; 200; 20 SUSPENSION ORAL at 11:48

## 2022-08-26 RX ADMIN — ONDANSETRON HYDROCHLORIDE 4 MG: 2 SOLUTION INTRAMUSCULAR; INTRAVENOUS at 10:11

## 2022-08-26 NOTE — PROGRESS NOTES
~2205: The patient arrived by wheelchair with reports of leaking of clear/pinkish fluid that started at 1700 today. The patient denies vaginal bleeding and reports active fetal movement. The patient is received to [facility] (L&D 12).  ~2215: KALYN Preciador is at the bedside speaking with the patient. Nitrazine is inconclusive. SVE attempted; the patient states that the baby was breech last week. ~2222: Amnisure is collected by SHELLIE Martinez RN.  ~8771: Amnisure is positive. Marta Umaña CNM is informed of results. ~2235: KALYN Barrientos Poser is at the bedside discussing the plan of care with the patient.  ~2258: Handheld ultrasound used by Dr Netta Darling to determine position of the baby. Vertex position is noted. ~2345: UA and GBS is collected by SHELLIE Martinez RN.  [email or web address]: The patient is assisted to a wheelchair to be transferred to [facility]. All belongings are collected by the patient before transfer. ~0050: TRANSFER - OUT REPORT:    Verbal report given to Kristina Hernandez RN on Julien  being transferred to [facility] 315 for routine progression of care       Report consisted of patients Situation, Background, Assessment and   Recommendations(SBAR). Information from the following report(s) SBAR, MAR, and Recent Results was reviewed with the receiving nurse. Lines:   Peripheral IV 08/25/22 Anterior;Distal;Left Forearm (Active)   Site Assessment Clean, dry, & intact 08/25/22 2245   Phlebitis Assessment 0 08/25/22 2245   Infiltration Assessment 0 08/25/22 2245   Dressing Status Clean, dry, & intact 08/25/22 2245   Dressing Type Tape;Transparent 08/25/22 2245   Hub Color/Line Status Pink 08/25/22 2245        Opportunity for questions and clarification was provided.       Patient transported with:   Registered Nurse

## 2022-08-26 NOTE — PROGRESS NOTES
1610. TRANSFER - IN REPORT:    Verbal report received from arvin Harris rn(name) on [facility], rn  being received from aparna Huggins rn(unit) for routine progression of care      Report consisted of patients Situation, Background, Assessment and   Recommendations(SBAR). Information from the following report(s) SBAR, Intake/Output, MAR, and Recent Results was reviewed with the receiving nurse. Opportunity for questions and clarification was provided. Assessment completed upon patients arrival to unit and care assumed. 1730. Obtained prenatal records for pt.      [address] cytotec given buccal per dr. Sangeeta Elias orders. 2330. Bedside and Verbal shift change report given to mathew Valdes rn (oncoming nurse) by aparna Huggins rn (offgoing nurse). Report included the following information SBAR, Intake/Output, MAR, and Recent Results.

## 2022-08-26 NOTE — PROGRESS NOTES
Bedside and Verbal shift change report given to 2825 Joselyn Randall (oncoming nurse) by Suze OLIVAS (offgoing nurse). Report included the following information SBAR, Kardex, Intake/Output, MAR, and Recent Results. 0315: Patient informed nurse of contraction-like pain. 0320: Placed patient on the monitor. RN noted irritability. Baby was very active. 0: Spoke with L&D nurse as patient was still very uncomfortable. RN was told to start 500 ml bolus. 0355: IV infiltrated. 0405: 500 ml bolus started. 8623: Bolus complete. Patient resting more comfortably with no complaints of contractions. Stated she felt like the additional fluids helped greatly.

## 2022-08-26 NOTE — H&P
28 yo  AA female SIUP self reported LOUANN 10/2/22 @ 34 weeks 2 days-  presents to L&D with complaint of leaking fluid starting approx 1700 this evening. Pt went home and had continued leaking, she then came to the hospital for evaluation. Pt sees Dr. Krissy Post at [facility], we do not have records at this time. She has been following with M - hx 3 - 2018 - both babies 4#8oz- second child in  was an IUFD at 34 weeks. She denies heart, liver, lung, kidney, ENT, Head, cancer, bleeding clotting disorders, or other health complications. No prenatals to review- hospital will fax release in am.            Past Medical History   No past medical history on file. Past Surgical History   No past surgical history on file. No family history on file. Social History            Socioeconomic History    Marital status: UNKNOWN       Spouse name: Not on file    Number of children: Not on file    Years of education: Not on file    Highest education level: Not on file   Occupational History    Not on file   Tobacco Use    Smoking status: Former       Packs/day: 1.00       Types: Cigarettes    Smokeless tobacco: Never   Substance and Sexual Activity    Alcohol use: Yes       Comment: \"every other day\"    Drug use: Not Currently       Types: Marijuana       Comment: occ    Sexual activity: Yes       Partners: Male       Birth control/protection: Pill   Other Topics Concern    Not on file   Social History Narrative    Not on file      Social Determinants of Health      Financial Resource Strain: Not on file   Food Insecurity: Not on file   Transportation Needs: Not on file   Physical Activity: Not on file   Stress: Not on file   Social Connections: Not on file   Intimate Partner Violence: Not on file   Housing Stability: Not on file            ALLERGIES: Patient has no known allergies. Review of Systems   Gastrointestinal:  Positive for abdominal distention.         Gravid   Genitourinary:  Positive for vaginal discharge. Leaking fluid from vagina   All other systems reviewed and are negative. Vitals:     22 2213   BP: (P) 119/74   Pulse: (!) (P) 106             Physical Exam  Vitals and nursing note reviewed. Exam conducted with a chaperone present. Constitutional:       Appearance: Normal appearance. HENT:      Head: Normocephalic. Right Ear: Tympanic membrane normal.      Left Ear: Tympanic membrane normal.      Nose: Nose normal.      Mouth/Throat:      Mouth: Mucous membranes are moist.   Eyes:      Extraocular Movements: Extraocular movements intact. Conjunctiva/sclera: Conjunctivae normal.   Cardiovascular:      Rate and Rhythm: Normal rate. Pulmonary:      Effort: Pulmonary effort is normal.   Abdominal:      General: There is distension. Comments: Gravid   Genitourinary:     General: Normal vulva. Comments: SVE- no presenting part felt  Unknown cephalic- pt verbalized fetus was breech at last US  Cervix - outter os open unable to assess well due to lack of presenting part to push against  Musculoskeletal:      Cervical back: Normal range of motion. Skin:     General: Skin is warm. Capillary Refill: Capillary refill takes less than 2 seconds. Neurological:      General: No focal deficit present. Mental Status: She is alert and oriented to person, place, and time.    Psychiatric:         Mood and Affect: Mood normal.         Behavior: Behavior normal.         MDM  Risk of Complications, Morbidity, and/or Mortality  Presenting problems: moderate  Diagnostic procedures: moderate  Management options: moderate                   A/P  28 yo  SIUP @ 34 weeks 2 days  PPROM @ 1700    Admit to L &D  BMZ x 1 dose given  On Po Zithromax x 1 dose and IV ampicillin  MFM consult  Expectant management  NSTs BID    Jake Lassiter MD

## 2022-08-26 NOTE — CONSULTS
MATERNAL FETAL MEDICINE  INPATIENT CONSULTATION    REQUESTED BY: Garfield Rodriguez MD   DATE OF CONSULT: 22    REASON FOR CONSULTATION: premature rupture of membranes    Mani Parmar is a 27 y.o. female  at 34w5d. TIFFANY Winter broke her bag of water yesterday. She has been leaking since that time. She denies fevers, chills, foul odor, fundal tenderness. She is having irregular contractions. She denies vaginal bleeding. Fetus is active. Both her prior term pregnancies were complicated by fetal growth restriction. She had a fetal demise at 31 weeks gestation due to placental abruption. Patient Active Problem List   Diagnosis Code    34 weeks gestation of pregnancy Z3A.34     premature rupture of membranes with onset of labor more than 24 hours following rupture in third trimester O42.113    Prior poor obstetrical history in third trimester, antepartum O09.293       Past Medical History:   Diagnosis Date    Abnormal Papanicolaou smear of cervix     HPV+' biopsy done    Anemia     Epilepsy ([facility] [address]     last seizure a year ago-not taking meds    Psychiatric problem     anxiety-no meds       History reviewed. No pertinent surgical history.     OB History    Para Term  AB Living   4 3 2 1 0 2   SAB IAB Ectopic Molar Multiple Live Births   0 0 0 0 0 2      # Outcome Date GA Lbr Aleks/2nd Weight Sex Delivery Anes PTL Lv   4 Current            3 Term  37w0d  2.041 kg M Vag-Spont   CARLEE      Birth Comments: Fetal growth restriction   2   31w0d   F Vag-Spont   FD      Complications: Abruptio Placenta   1 Term  37w0d  2.041 kg F Vag-Spont   CARLEE      Birth Comments: Fetal growth restriction         No Known Allergies      Current Facility-Administered Medications:     zolpidem (AMBIEN) tablet 5 mg, 5 mg, Oral, QHS PRN, Jose Syed CNM, 5 mg at 22 0438    sodium chloride (NS) flush 5-40 mL, 5-40 mL, IntraVENous, Q8H, Juan Carlos Calixto CNM sodium chloride (NS) flush 5-40 mL, 5-40 mL, IntraVENous, PRN, Cristal Jack, ANGELICA    ampicillin (OMNIPEN) 2 g in 0.9% sodium chloride (MBP/ADV) 100 mL MBP, 2 g, IntraVENous, Q6H, Sulema Calixto CNM, Last Rate: 200 mL/hr at 08/26/22 1329, 2 g at 08/26/22 1329    acetaminophen (TYLENOL) tablet 650 mg, 650 mg, Oral, Q4H PRN, Mikael Iverson CNM, 650 mg at 08/26/22 0109    betamethasone (CELESTONE) injection 12 mg, 12 mg, IntraMUSCular, Q24H, Mikael Iverson CNM, 12 mg at 08/25/22 2359    ondansetron [facility]) injection 4 mg, 4 mg, IntraVENous, Q4H PRN, Mikael Iverson CNM, 4 mg at 08/26/22 1011    diphenhydrAMINE (BENADRYL) capsule 25 mg, 25 mg, Oral, QHS PRN, Mikael Iverson CNM    docusate sodium (COLACE) capsule 100 mg, 100 mg, Oral, BID PRN, Mikael Iverson CNM, 100 mg at 08/26/22 1150    alum-mag hydroxide-simeth (MYLANTA) oral suspension 30 mL, 30 mL, Oral, Q4H PRN, Mikael Iverson CNM, 30 mL at 08/26/22 1148    prenatal vitamin tablet 1 Tablet, 1 Tablet, Oral, DAILY, Mikael Iverson CNM, 1 Tablet at 08/26/22 1535    ferrous sulfate tablet 325 mg, 1 Tablet, Oral, DAILY WITH BREAKFAST, Mikael Iverson CNM, 325 mg at 08/26/22 0911    Social History     Tobacco Use    Smoking status: Every Day     Packs/day: 0.25     Types: Cigarettes    Smokeless tobacco: Never   Vaping Use    Vaping Use: Never used   Substance Use Topics    Alcohol use: Not Currently     Comment: \"every other day\"    Drug use: Not Currently     Types: Marijuana     Comment: occ       History reviewed. No pertinent family history. Review of Systems   Constitutional: Negative. HENT: Negative. Eyes: Negative. Respiratory: Negative. Cardiovascular: Negative. Gastrointestinal: Negative. Genitourinary: Negative. Musculoskeletal: Negative. Skin: Negative. Neurological: Negative. Endo/Heme/Allergies: Negative. Psychiatric/Behavioral: Negative. Visit Vitals  BP 99/61 (BP 1 Location: Left upper arm, BP Patient Position: At rest)   Pulse 84   Temp 98.4 °F (36.9 °C)   Resp 16   Ht 5' 8\" (1.727 m)   Wt 86.6 kg (191 lb)   SpO2 100%   Breastfeeding No   BMI 29.04 kg/m²       Gen: Comfortable, NAD  Resp: Comfortable respirations  CV: Well perfused  Abd: Gravid, NT, ND  Ext: Moving all extremities well  Neuro: Alert, interactive, appropriate    Recent Results (from the past 24 hour(s))   RUPTURE OF FETAL MEMBRANES, POC    Collection Time: 08/25/22 10:22 PM   Result Value Ref Range    Rupture of fetal membrane Positive Negative    Control line present? Acceptable     Internal negative control Acceptable     Kit Lot No. [id number]     Expiration date 01/06/2025    CBC WITH AUTOMATED DIFF    Collection Time: 08/25/22 11:51 PM   Result Value Ref Range    WBC 5.9 3.6 - 11.0 K/uL    RBC 3.20 (L) 3.80 - 5.20 M/uL    HGB 10.6 (L) 11.5 - 16.0 g/dL    HCT 29.1 (L) 35.0 - 47.0 %    MCV 90.9 80.0 - 99.0 FL    MCH 33.1 26.0 - 34.0 PG    MCHC 36.4 30.0 - 36.5 g/dL    RDW 12.2 11.5 - 14.5 %    PLATELET 619 961 - 818 K/uL    MPV 9.5 8.9 - 12.9 FL    NRBC 0.0 0  WBC    ABSOLUTE NRBC 0.00 0.00 - 0.01 K/uL    NEUTROPHILS 61 32 - 75 %    LYMPHOCYTES 22 12 - 49 %    MONOCYTES 13 5 - 13 %    EOSINOPHILS 2 0 - 7 %    BASOPHILS 1 0 - 1 %    IMMATURE GRANULOCYTES 1 (H) 0.0 - 0.5 %    ABS. NEUTROPHILS 3.6 1.8 - 8.0 K/UL    ABS. LYMPHOCYTES 1.3 0.8 - 3.5 K/UL    ABS. MONOCYTES 0.7 0.0 - 1.0 K/UL    ABS. EOSINOPHILS 0.1 0.0 - 0.4 K/UL    ABS. BASOPHILS 0.0 0.0 - 0.1 K/UL    ABS. IMM.  GRANS. 0.1 (H) 0.00 - 0.04 K/UL    DF AUTOMATED     URINALYSIS W/ REFLEX CULTURE    Collection Time: 08/26/22 12:07 AM    Specimen: Urine   Result Value Ref Range    Color YELLOW/STRAW      Appearance CLEAR CLEAR      Specific gravity 1.012 1.003 - 1.030      pH (UA) 7.0 5.0 - 8.0      Protein Negative NEG mg/dL    Glucose Negative NEG mg/dL    Ketone Negative NEG mg/dL    Bilirubin Negative NEG      Blood Negative NEG      Urobilinogen 1.0 0.2 - 1.0 EU/dL    Nitrites Negative NEG      Leukocyte Esterase Negative NEG      UA:UC IF INDICATED CULTURE NOT INDICATED BY UA RESULT CNI      WBC 0-4 0 - 4 /hpf    RBC 0-5 0 - 5 /hpf    Epithelial cells FEW FEW /lpf    Bacteria Negative NEG /hpf    Hyaline cast 0-2 0 - 5 /lpf   TYPE & SCREEN    Collection Time: 22 12:07 AM   Result Value Ref Range    Crossmatch Expiration 2022,2359     ABO/Rh(D) B POSITIVE     Antibody screen NEG        ULTRASOUND:  Cephalic  EFW 4444 gms  JANESSA 4 cm    ASSESSMENT:    27 y.o. female  at 34w5d with premature rupture of membranes. We discussed the implications of premature rupture of membranes. We discussed the increased risks of  labor and  infection. The risks of PPROM include maternal sepsis, cord prolapse, and placental abruption. [facility] is beyond 34 weeks gestation and declines expectant management. She prefers augmentation of labor. PLAN:    One dose of  corticosteroids was administered. She can begin augmentation prior to receiving the second dose. Recommend penicillin for GBS prophylaxis. Anticipate vaginal delivery  Fetal status reassuring.     Roque Shah MD  Maternal Fetal Medicine

## 2022-08-26 NOTE — PROGRESS NOTES
High Risk Obstetrics Progress Note    Name: Kat Woodard MRN: [id number]  SSN: [id number]    YOB: 1990  Age: 27 y.o. Sex: female      Subjective:      LOS: 1 day    Estimated Date of Delivery: 10/2/22   Gestational Age Today: 35w7d     Patient admitted for  premature rupture of membranes. States she does have moderate contractions, mild nausea/vomiting, moderate vaginal leaking of fluid, and normal fetal movement. Objective:     Vitals:  Blood pressure 99/61, pulse 84, temperature 98.4 °F (36.9 °C), resp. rate 16, height 5' 8\" (1.727 m), weight 86.6 kg (191 lb), SpO2 100 %, not currently breastfeeding. Temp (24hrs), Av.2 °F (36.8 °C), Min:97.7 °F (36.5 °C), Max:98.7 °F (93.9 °C)    Systolic (53GVH), MMX:831 , Min:99 , EXK:603      Diastolic (39UFF), FAJ:68, Min:61, Max:74       Intake and Output:         Physical Exam:  Patient without distress. Fundus: soft and non tender  Perineum: blood absent, amniotic fluid present  Cervical Exam: 1 cm dilated    20% effaced    -3 station    Consistency: Medium  Lower Extremities:  - Edema 1+       Membranes:  Premature Rupture of Membranes; Amniotic Fluid: clear fluid    Uterine Activity:  Date/time of onset:   Frequency: irregular  Intensity: moderate    Fetal Heart Rate:  Baseline: 130 per minute  Variability: moderate  Accelerations: yes  Decelerations: none  Uterine contractions: irregular, every 10 minutes  Uterine irritability: yes        Labs:   Recent Results (from the past 36 hour(s))   RUPTURE OF FETAL MEMBRANES, POC    Collection Time: 22 10:22 PM   Result Value Ref Range    Rupture of fetal membrane Positive Negative    Control line present?  Acceptable     Internal negative control Acceptable     Kit Lot No. [id number]     Expiration date 2025    CBC WITH AUTOMATED DIFF    Collection Time: 22 11:51 PM   Result Value Ref Range    WBC 5.9 3.6 - 11.0 K/uL    RBC 3.20 (L) 3.80 - 5.20 M/uL    HGB 10.6 (L) 11.5 - 16.0 g/dL    HCT 29.1 (L) 35.0 - 47.0 %    MCV 90.9 80.0 - 99.0 FL    MCH 33.1 26.0 - 34.0 PG    MCHC 36.4 30.0 - 36.5 g/dL    RDW 12.2 11.5 - 14.5 %    PLATELET 586 913 - 874 K/uL    MPV 9.5 8.9 - 12.9 FL    NRBC 0.0 0  WBC    ABSOLUTE NRBC 0.00 0.00 - 0.01 K/uL    NEUTROPHILS 61 32 - 75 %    LYMPHOCYTES 22 12 - 49 %    MONOCYTES 13 5 - 13 %    EOSINOPHILS 2 0 - 7 %    BASOPHILS 1 0 - 1 %    IMMATURE GRANULOCYTES 1 (H) 0.0 - 0.5 %    ABS. NEUTROPHILS 3.6 1.8 - 8.0 K/UL    ABS. LYMPHOCYTES 1.3 0.8 - 3.5 K/UL    ABS. MONOCYTES 0.7 0.0 - 1.0 K/UL    ABS. EOSINOPHILS 0.1 0.0 - 0.4 K/UL    ABS. BASOPHILS 0.0 0.0 - 0.1 K/UL    ABS. IMM. GRANS. 0.1 (H) 0.00 - 0.04 K/UL    DF AUTOMATED     URINALYSIS W/ REFLEX CULTURE    Collection Time: 22 12:07 AM    Specimen: Urine   Result Value Ref Range    Color YELLOW/STRAW      Appearance CLEAR CLEAR      Specific gravity 1.012 1.003 - 1.030      pH (UA) 7.0 5.0 - 8.0      Protein Negative NEG mg/dL    Glucose Negative NEG mg/dL    Ketone Negative NEG mg/dL    Bilirubin Negative NEG      Blood Negative NEG      Urobilinogen 1.0 0.2 - 1.0 EU/dL    Nitrites Negative NEG      Leukocyte Esterase Negative NEG      UA:UC IF INDICATED CULTURE NOT INDICATED BY UA RESULT CNI      WBC 0-4 0 - 4 /hpf    RBC 0-5 0 - 5 /hpf    Epithelial cells FEW FEW /lpf    Bacteria Negative NEG /hpf    Hyaline cast 0-2 0 - 5 /lpf   TYPE & SCREEN    Collection Time: 22 12:07 AM   Result Value Ref Range    Crossmatch Expiration 2022,2359     ABO/Rh(D) B POSITIVE     Antibody screen NEG        Assessment and Plan: Active Problems:    34 weeks gestation of pregnancy (2022)      History of  premature rupture of membranes (PPROM) (2022)        Premature Rupture of the Membranes:  48 hour course of steroids to promote fetal lung maturity.   Continue with current plan  MFM Consult Today      Signed By: Tim Dorsey MD     2022

## 2022-08-26 NOTE — CONSULTS
[address] [facility]  Prenatal Consult  Nadeen Augustine MRN: [id number] [facility]: [id number]  Note Date: 2022 Note Time: 17:30:00  Place of Service: Labor and DeliveryRequested By: Shruti Junior MD  Reason for Consultation: Anticipated delivery at 34 weeks GA  Maternal History  : 1990Mother's Age: 32Mother's Race: BlackP:  3    EDC OB:   Pregnancy Complications  Premature rupture of membranes. > 24 hr onset of labor, 3rd tri (O42.113)  Maternal Steroids Yes  Last Dose Date: 2022 at 23:00:00  Maternal Medications: Yes  Ampicillin  Ferrous Sulfate  Prenatal vitamins  Pregnancy Comment  Two prior SGA events born at early term (37 weeks), prior IUFD due to abruptio at 31 weeks GA. Presented 2022 with confirmed PROM w/o contractions. Mother desires augmentation rather than expectant management. Present Plan  Received beta x 1, antibiotics. Prenatal labs at [facility]. patient will supply through patient portal.  Discussion/Counseling  Premature delivery at 34 weeks GA  Recommendations  I have reviewed the mother`s medical chart and spoken with the obstetrician requesting this consult. I met with the mother and MGM. I reviewed the most common problems encountered for babies born at 29 weeks gestation, stressing that all babies are different, and the chances of complications tend to lessen at advancing gestational ages. While most any organ system can have a complication, the absolute risk of severe complications is very low and the opportunity for a good outcome is high. Among infants admitted to the NICU, survival is approximately 99% and survival without severe complications is very high. I discussed the delivery room management, and explained the personnel that will be present at delivery. I reviewed the plan for delayed cord clamping (if appropriate) and thermoregulation support.   Some infants at this age need respiratory support due to lung immaturity, commonly CPAP or a nasal cannula, though some need mechanical ventilation. We also discussed the use of artificial surfactant, which may or may not be needed. While uncommon, some infants need other measures in their resuscitation (e.g. CPR, fluid, blood). I reviewed the typical cares given during admission to the NICU. Some infants at this gestation need IV access and that may be an umbilical catheter(s), PICC line or peripheral IV to allow nutrition. When sufficiently stable, gavage feedings will be started. We discussed the critical importance of lactation to optimize outcome (improved neurodevelopment, reduced risk of NEC and infections among other things). We discussed how we will support mother's lactation. We discussed common discharge goals, including mature thermoregulation, mature respiratory status and ability to thrive on oral feedings. Many infants achieve this around 36-39 weeks corrected gestation, although some infants are ready sooner and some take longer. Time was allotted for the family to ask questions, and all questions were answered to the best of my ability, given the information provided. The family understands and agrees with the present plan. Thank you for inviting me to speak with the family. We remain available if the family desires further discussion or clarification. The total length of floor unit time was 30 minute(s). Counseling and/or coordination of care dominated more than fifty percent of the time.   Authenticated by: KIRSTEN Miller   Date/Time: 08/26/2022 17:59

## 2022-08-26 NOTE — PROGRESS NOTES
High Risk Obstetrics Progress Note    Name: Dilan Finch MRN: [id number]  SSN: [id number]    YOB: 1990  Age: 27 y.o. Sex: female      Subjective:      LOS: 1 day    Estimated Date of Delivery: 10/2/22   Gestational Age Today: 35w7d     Patient admitted for  premature rupture of membranes. States she does not have abdominal pain  , chest pain, contractions, fever, headache , nausea and vomiting, pelvic pressure, right upper quadrant pain  , shortness of breath, swelling, vaginal bleeding , and visual disturbances. SHe c/o LOF. Clear, No N/V/F/C  Objective:     Vitals:  Blood pressure 99/61, pulse 84, temperature 98.2 °F (36.8 °C), resp. rate 16, height 5' 8\" (1.727 m), weight 86.6 kg (191 lb), SpO2 100 %, not currently breastfeeding. Temp (24hrs), Av.2 °F (36.8 °C), Min:97.7 °F (36.5 °C), Max:98.7 °F (81.9 °C)    Systolic (78GHP), YBL:984 , Min:99 , VLH:459      Diastolic (51DUV), CQQ:13, Min:61, Max:74       Intake and Output:         Physical Exam:  Deferred       Membranes:   Premature Rupture of Membranes; Amniotic Fluid: clear fluid    Uterine Activity:  None    Fetal Heart Rate:  Baseline: 130 per minute  Variability: moderate  Accelerations: yes  Decelerations: none  Uterine contractions: none        Labs:   Recent Results (from the past 36 hour(s))   RUPTURE OF FETAL MEMBRANES, POC    Collection Time: 22 10:22 PM   Result Value Ref Range    Rupture of fetal membrane Positive Negative    Control line present?  Acceptable     Internal negative control Acceptable     Kit Lot No. [id number]     Expiration date 2025    CBC WITH AUTOMATED DIFF    Collection Time: 22 11:51 PM   Result Value Ref Range    WBC 5.9 3.6 - 11.0 K/uL    RBC 3.20 (L) 3.80 - 5.20 M/uL    HGB 10.6 (L) 11.5 - 16.0 g/dL    HCT 29.1 (L) 35.0 - 47.0 %    MCV 90.9 80.0 - 99.0 FL    MCH 33.1 26.0 - 34.0 PG    MCHC 36.4 30.0 - 36.5 g/dL    RDW 12.2 11.5 - 14.5 %    PLATELET 704 177 - 096 K/uL MPV 9.5 8.9 - 12.9 FL    NRBC 0.0 0  WBC    ABSOLUTE NRBC 0.00 0.00 - 0.01 K/uL    NEUTROPHILS 61 32 - 75 %    LYMPHOCYTES 22 12 - 49 %    MONOCYTES 13 5 - 13 %    EOSINOPHILS 2 0 - 7 %    BASOPHILS 1 0 - 1 %    IMMATURE GRANULOCYTES 1 (H) 0.0 - 0.5 %    ABS. NEUTROPHILS 3.6 1.8 - 8.0 K/UL    ABS. LYMPHOCYTES 1.3 0.8 - 3.5 K/UL    ABS. MONOCYTES 0.7 0.0 - 1.0 K/UL    ABS. EOSINOPHILS 0.1 0.0 - 0.4 K/UL    ABS. BASOPHILS 0.0 0.0 - 0.1 K/UL    ABS. IMM.  GRANS. 0.1 (H) 0.00 - 0.04 K/UL    DF AUTOMATED     URINALYSIS W/ REFLEX CULTURE    Collection Time: 22 12:07 AM    Specimen: Urine   Result Value Ref Range    Color YELLOW/STRAW      Appearance CLEAR CLEAR      Specific gravity 1.012 1.003 - 1.030      pH (UA) 7.0 5.0 - 8.0      Protein Negative NEG mg/dL    Glucose Negative NEG mg/dL    Ketone Negative NEG mg/dL    Bilirubin Negative NEG      Blood Negative NEG      Urobilinogen 1.0 0.2 - 1.0 EU/dL    Nitrites Negative NEG      Leukocyte Esterase Negative NEG      UA:UC IF INDICATED CULTURE NOT INDICATED BY UA RESULT CNI      WBC 0-4 0 - 4 /hpf    RBC 0-5 0 - 5 /hpf    Epithelial cells FEW FEW /lpf    Bacteria Negative NEG /hpf    Hyaline cast 0-2 0 - 5 /lpf   TYPE & SCREEN    Collection Time: 22 12:07 AM   Result Value Ref Range    Crossmatch Expiration 2022,235     ABO/Rh(D) B POSITIVE     Antibody screen NEG    CULTURE, GENITAL GROUP B STREP    Collection Time: 22 12:07 AM    Specimen: Vagina   Result Value Ref Range    Special Requests: NO SPECIAL REQUESTS      Culture result: NO GROUP B BETA STREPTOCOCCUS ISOLATED SO FAR         Assessment and Plan:      Principal Problem:     premature rupture of membranes with onset of labor more than 24 hours following rupture in third trimester (2022)    Active Problems:    34 weeks gestation of pregnancy (2022)      Prior poor obstetrical history in third trimester, antepartum (2022)        Premature Rupture of the Membranes:  @ 35w5d with h/o IUFD and Abruption, declines Expectant management, desires Labor Augmentation. Will Proceed with Augmentation per MFM and Patient shared decision making.     Signed By: Kamini Andersen MD     August 26, 2022

## 2022-08-26 NOTE — PROGRESS NOTES
Discussed Plan with Luz Estevez after MFM Consult. SHe agrees with plan for labor augmentation, risk, benefits, indications and alternatives discussed. Plan: Per MFM    One dose of  corticosteroids was administered. She can begin augmentation prior to receiving the second dose. Recommend penicillin for GBS prophylaxis. Anticipate vaginal delivery  Fetal status reassuring.      Will Transfer to L&d for labor augmentation

## 2022-08-26 NOTE — PROGRESS NOTES
TRANSFER - OUT REPORT:    Verbal report given to bruna rn(name) on Chioma Robison  being transferred to L&D(unit) for ordered procedure       Report consisted of patients Situation, Background, Assessment and   Recommendations(SBAR). Information from the following report(s) SBAR, Kardex, and MAR was reviewed with the receiving nurse. Lines:   Peripheral IV 08/26/22 Anterior; Left Forearm (Active)   Site Assessment Clean, dry, & intact 08/26/22 0753   Phlebitis Assessment 0 08/26/22 0753   Infiltration Assessment 0 08/26/22 0753   Dressing Status Clean, dry, & intact 08/26/22 0753   Dressing Type Transparent 08/26/22 0753   Hub Color/Line Status Infusing 08/26/22 0753   Action Taken Open ports on tubing capped 08/26/22 0405   Alcohol Cap Used Yes 08/26/22 0753        Opportunity for questions and clarification was provided.       Patient transported with:   Registered Nurse 1/month(s)

## 2022-08-26 NOTE — ED PROVIDER NOTES
28 yo  AA female SIUP self reported LOUANN 10/2/22 @ 34 weeks 2 days-  presents to L&D with complaint of leaking fluid starting approx 1700 this evening. Pt went home and had continued leaking, she then came to the hospital for evaluation. Pt sees Dr. Sundar Tejeda at [facility], we do not have records at this time. She has been following with M - hx 3 - 2018 - both babies 4#8oz- second child in  was an IUFD at 34 weeks. She denies heart, liver, lung, kidney, ENT, Head, cancer, bleeding clotting disorders, or other health complications. No prenatals to review- hospital will fax release in am.          No past medical history on file. No past surgical history on file. No family history on file. Social History     Socioeconomic History    Marital status: UNKNOWN     Spouse name: Not on file    Number of children: Not on file    Years of education: Not on file    Highest education level: Not on file   Occupational History    Not on file   Tobacco Use    Smoking status: Former     Packs/day: 1.00     Types: Cigarettes    Smokeless tobacco: Never   Substance and Sexual Activity    Alcohol use: Yes     Comment: \"every other day\"    Drug use: Not Currently     Types: Marijuana     Comment: occ    Sexual activity: Yes     Partners: Male     Birth control/protection: Pill   Other Topics Concern    Not on file   Social History Narrative    Not on file     Social Determinants of Health     Financial Resource Strain: Not on file   Food Insecurity: Not on file   Transportation Needs: Not on file   Physical Activity: Not on file   Stress: Not on file   Social Connections: Not on file   Intimate Partner Violence: Not on file   Housing Stability: Not on file         ALLERGIES: Patient has no known allergies. Review of Systems   Gastrointestinal:  Positive for abdominal distention. Gravid   Genitourinary:  Positive for vaginal discharge.         Leaking fluid from vagina   All other systems reviewed and are negative. Vitals:    22 2213   BP: (P) 119/74   Pulse: (!) (P) 106            Physical Exam  Vitals and nursing note reviewed. Exam conducted with a chaperone present. Constitutional:       Appearance: Normal appearance. HENT:      Head: Normocephalic. Right Ear: Tympanic membrane normal.      Left Ear: Tympanic membrane normal.      Nose: Nose normal.      Mouth/Throat:      Mouth: Mucous membranes are moist.   Eyes:      Extraocular Movements: Extraocular movements intact. Conjunctiva/sclera: Conjunctivae normal.   Cardiovascular:      Rate and Rhythm: Normal rate. Pulmonary:      Effort: Pulmonary effort is normal.   Abdominal:      General: There is distension. Comments: Gravid   Genitourinary:     General: Normal vulva. Comments: SVE- no presenting part felt  Unknown cephalic- pt verbalized fetus was breech at last US  Cervix - outter os open unable to assess well due to lack of presenting part to push against  Musculoskeletal:      Cervical back: Normal range of motion. Skin:     General: Skin is warm. Capillary Refill: Capillary refill takes less than 2 seconds. Neurological:      General: No focal deficit present. Mental Status: She is alert and oriented to person, place, and time.    Psychiatric:         Mood and Affect: Mood normal.         Behavior: Behavior normal.        MDM  Risk of Complications, Morbidity, and/or Mortality  Presenting problems: moderate  Diagnostic procedures: moderate  Management options: moderate           Procedures      A/P  28 yo  SIUP @ 34 weeks 2 days  PPROM @ 1700     Reviewed with Juan Pablo   Admit    Labs, IV bolus, U/A, abx, betamethasone,   Dr. Luisana Cook V scan- cephalic      Plan latency abx, steroids and expectant management       Alesha Garner CNM

## 2022-08-26 NOTE — PROGRESS NOTES
Pt c/o cramping abd pain pt placed on monitor no bleeding , or clots noted pt still draining clear drainage. Pt having some n/v. Called Dr. Jose Elizabeth . Md over to see pt . Doctor exam pt. Pt given bolus and then placed on iv fluids per Dr. Jose Elizabeth.  After bolus pt denies any cramping pt medicated earlier with zofran which controled the n/v.

## 2022-08-27 PROCEDURE — 74011250636 HC RX REV CODE- 250/636: Performed by: OBSTETRICS & GYNECOLOGY

## 2022-08-27 PROCEDURE — 3E0P7VZ INTRODUCTION OF HORMONE INTO FEMALE REPRODUCTIVE, VIA NATURAL OR ARTIFICIAL OPENING: ICD-10-PCS | Performed by: OBSTETRICS & GYNECOLOGY

## 2022-08-27 PROCEDURE — 74011250636 HC RX REV CODE- 250/636

## 2022-08-27 PROCEDURE — 75410000002 HC LABOR FEE PER 1 HR

## 2022-08-27 PROCEDURE — 74011000258 HC RX REV CODE- 258: Performed by: OBSTETRICS & GYNECOLOGY

## 2022-08-27 PROCEDURE — 74011250637 HC RX REV CODE- 250/637: Performed by: OBSTETRICS & GYNECOLOGY

## 2022-08-27 PROCEDURE — 65410000002 HC RM PRIVATE OB

## 2022-08-27 PROCEDURE — 75410000003 HC RECOV DEL/VAG/CSECN EA 0.5 HR

## 2022-08-27 PROCEDURE — 74011250637 HC RX REV CODE- 250/637: Performed by: ADVANCED PRACTICE MIDWIFE

## 2022-08-27 PROCEDURE — 75410000000 HC DELIVERY VAGINAL/SINGLE

## 2022-08-27 RX ORDER — HYDROCODONE BITARTRATE AND ACETAMINOPHEN 5; 325 MG/1; MG/1
1 TABLET ORAL
Status: DISCONTINUED | OUTPATIENT
Start: 2022-08-27 | End: 2022-08-28 | Stop reason: HOSPADM

## 2022-08-27 RX ORDER — AMMONIA 15 % (W/V)
1 AMPUL (EA) INHALATION AS NEEDED
Status: DISCONTINUED | OUTPATIENT
Start: 2022-08-27 | End: 2022-08-28 | Stop reason: HOSPADM

## 2022-08-27 RX ORDER — SODIUM CHLORIDE 0.9 % (FLUSH) 0.9 %
5-40 SYRINGE (ML) INJECTION AS NEEDED
Status: DISCONTINUED | OUTPATIENT
Start: 2022-08-27 | End: 2022-08-28 | Stop reason: HOSPADM

## 2022-08-27 RX ORDER — OXYTOCIN/RINGER'S LACTATE 30/500 ML
87.3 PLASTIC BAG, INJECTION (ML) INTRAVENOUS AS NEEDED
Status: DISCONTINUED | OUTPATIENT
Start: 2022-08-27 | End: 2022-08-28 | Stop reason: HOSPADM

## 2022-08-27 RX ORDER — HYDROCODONE BITARTRATE AND ACETAMINOPHEN 7.5; 325 MG/1; MG/1
1 TABLET ORAL
Status: DISCONTINUED | OUTPATIENT
Start: 2022-08-27 | End: 2022-08-28 | Stop reason: HOSPADM

## 2022-08-27 RX ORDER — OXYTOCIN/RINGER'S LACTATE 30/500 ML
10 PLASTIC BAG, INJECTION (ML) INTRAVENOUS AS NEEDED
Status: DISCONTINUED | OUTPATIENT
Start: 2022-08-27 | End: 2022-08-28 | Stop reason: HOSPADM

## 2022-08-27 RX ORDER — IBUPROFEN 400 MG/1
800 TABLET ORAL EVERY 8 HOURS
Status: DISCONTINUED | OUTPATIENT
Start: 2022-08-27 | End: 2022-08-28 | Stop reason: HOSPADM

## 2022-08-27 RX ORDER — HYDROCORTISONE 1 %
CREAM (GRAM) TOPICAL AS NEEDED
Status: DISCONTINUED | OUTPATIENT
Start: 2022-08-27 | End: 2022-08-28 | Stop reason: HOSPADM

## 2022-08-27 RX ORDER — ACETAMINOPHEN 325 MG/1
650 TABLET ORAL
Status: DISCONTINUED | OUTPATIENT
Start: 2022-08-27 | End: 2022-08-27 | Stop reason: SDUPTHER

## 2022-08-27 RX ORDER — DIPHENHYDRAMINE HCL 25 MG
25 CAPSULE ORAL
Status: DISCONTINUED | OUTPATIENT
Start: 2022-08-27 | End: 2022-08-28 | Stop reason: HOSPADM

## 2022-08-27 RX ORDER — SIMETHICONE 80 MG
80 TABLET,CHEWABLE ORAL
Status: DISCONTINUED | OUTPATIENT
Start: 2022-08-27 | End: 2022-08-28 | Stop reason: HOSPADM

## 2022-08-27 RX ORDER — HYDROCORTISONE ACETATE PRAMOXINE HCL 2.5; 1 G/100G; G/100G
CREAM TOPICAL AS NEEDED
Status: DISCONTINUED | OUTPATIENT
Start: 2022-08-27 | End: 2022-08-28 | Stop reason: HOSPADM

## 2022-08-27 RX ORDER — SODIUM CHLORIDE 0.9 % (FLUSH) 0.9 %
5-40 SYRINGE (ML) INJECTION EVERY 8 HOURS
Status: DISCONTINUED | OUTPATIENT
Start: 2022-08-27 | End: 2022-08-28 | Stop reason: HOSPADM

## 2022-08-27 RX ORDER — OXYTOCIN/RINGER'S LACTATE 30/500 ML
PLASTIC BAG, INJECTION (ML) INTRAVENOUS
Status: COMPLETED
Start: 2022-08-27 | End: 2022-08-27

## 2022-08-27 RX ORDER — LANOLIN ALCOHOL/MO/W.PET/CERES
3 CREAM (GRAM) TOPICAL
Status: DISCONTINUED | OUTPATIENT
Start: 2022-08-27 | End: 2022-08-28 | Stop reason: HOSPADM

## 2022-08-27 RX ORDER — DOCUSATE SODIUM 100 MG/1
100 CAPSULE, LIQUID FILLED ORAL
Status: DISCONTINUED | OUTPATIENT
Start: 2022-08-27 | End: 2022-08-28 | Stop reason: HOSPADM

## 2022-08-27 RX ORDER — IBUPROFEN 400 MG/1
800 TABLET ORAL EVERY 8 HOURS
Status: DISCONTINUED | OUTPATIENT
Start: 2022-08-27 | End: 2022-08-27

## 2022-08-27 RX ORDER — ONDANSETRON 4 MG/1
4 TABLET, ORALLY DISINTEGRATING ORAL
Status: DISCONTINUED | OUTPATIENT
Start: 2022-08-27 | End: 2022-08-28 | Stop reason: HOSPADM

## 2022-08-27 RX ADMIN — HYDROCODONE BITARTRATE AND ACETAMINOPHEN 1 TABLET: 5; 325 TABLET ORAL at 02:45

## 2022-08-27 RX ADMIN — IBUPROFEN 800 MG: 400 TABLET, FILM COATED ORAL at 13:08

## 2022-08-27 RX ADMIN — OXYTOCIN 30000 MILLI-UNITS: 10 INJECTION INTRAVENOUS at 01:55

## 2022-08-27 RX ADMIN — IBUPROFEN 800 MG: 400 TABLET, FILM COATED ORAL at 22:26

## 2022-08-27 RX ADMIN — FERROUS SULFATE TAB 325 MG (65 MG ELEMENTAL FE) 325 MG: 325 (65 FE) TAB at 08:25

## 2022-08-27 RX ADMIN — IBUPROFEN 800 MG: 400 TABLET, FILM COATED ORAL at 02:41

## 2022-08-27 RX ADMIN — HYDROCODONE BITARTRATE AND ACETAMINOPHEN 1 TABLET: 7.5; 325 TABLET ORAL at 20:45

## 2022-08-27 RX ADMIN — Medication 1 TABLET: at 08:25

## 2022-08-27 RX ADMIN — HYDROCODONE BITARTRATE AND ACETAMINOPHEN 1 TABLET: 7.5; 325 TABLET ORAL at 08:26

## 2022-08-27 RX ADMIN — AMPICILLIN SODIUM 2 G: 2 INJECTION, POWDER, FOR SOLUTION INTRAVENOUS at 01:19

## 2022-08-27 RX ADMIN — HYDROCODONE BITARTRATE AND ACETAMINOPHEN 1 TABLET: 7.5; 325 TABLET ORAL at 17:03

## 2022-08-27 RX ADMIN — HYDROCODONE BITARTRATE AND ACETAMINOPHEN 1 TABLET: 7.5; 325 TABLET ORAL at 13:08

## 2022-08-27 NOTE — LACTATION NOTE
Infant born vaginally yesterday at 29 6/7 weeks gestation. Mom states she will try to pump, but is not sure she will be committed to it. She is a smoker and does not want it to impact infant. Educated mom on the benefits of breastmilk to the premature . Infant admitted to NICU. Pt will successfully establish breast milk supply by pumping with a hospital grade pump every 2-3 hours for approximately 20 minutes/8-10 x day with the correct size flange, and suction level for mother's comfort. To maximize milk production, mom taught to incorporate breast massage and hand expression into pumping sessions. All expressed breast milk (EBM) will be provided for infant use, in clean bottles/syringes for storage in NICU breastmilk refrigerator. Patient label with barcode,date and time applied to each container prior to transport to NICU. Proper cleaning of pump parts and good hand hygiene discussed. Mother is advised to rent a hospital grade pump to continue regimen at home. Progress of milk transition, pumping log, expected EBM volumes, care of engorged breasts discussed. The value of bonding with baby emphasized, strategies for participating in infant care, photos, footprints, touch, and holding skin to skin as soon as baby and mother are able have been shown to increase oxytocin levels. The breast will be offered as baby is ready; with the goal of eventual transition to breastfeeding.

## 2022-08-27 NOTE — PROGRESS NOTES
Bedside and Verbal shift change report given to Darylene Kava (oncoming nurse) by Nadeen Rushing (offgoing nurse). Report included the following information SBAR, Kardex, MAR, and Recent Results.

## 2022-08-27 NOTE — PROGRESS NOTES
2335: Bedside and Verbal shift change report given to DONNELL Allen RN (oncoming nurse) by Evangelina Ordaz RN (offgoing nurse). Report included the following information SBAR, Kardex, Procedure Summary, Intake/Output, MAR, Accordion, and Recent Results. 0030: Pt requesting more IV pain medication. RN discussing that IV pain medication can be given again at 0200. Pt verbalized understanding    0135: Pt calling out feeling like the baby is coming. 0136: SVE per this RN /+1    0139: Dr. Jacob Moser at bedside. SVE 10/100/+2. Pt beginning to push. 0149:  infant male taken to warmer. NICU at bedside. APGARs 5/7/9 assigned by NICU. 0240: IV infiltrated and removed. 80: Notified Dr. Jacob Moser of IV infiltration and fundal checks, firm and small bleeding. MD OK with watching bleeding and not placing another IV at this time. 4663: Walked into pt room and pt tearful and upset on phone with family issue. 0252: Pt up to restroom. Tolerated ambulation well and was able to void. 200: Notified Dr. Jacob Moser of increased blood pressure and situation. 0340: Pt walked to bathroom to void. Was able to void and tolerated ambulation well.    0400: Transferring pt to NICU via wheelchair. 0430: TRANSFER - OUT REPORT:    Verbal report given to AMEYA Grace RN (name) on [facility]  being transferred to [facility] (unit) for routine progression of care       Report consisted of patients Situation, Background, Assessment and   Recommendations(SBAR). Information from the following report(s) SBAR, Kardex, Procedure Summary, Intake/Output, MAR, Accordion, and Recent Results was reviewed with the receiving nurse. Lines:       Opportunity for questions and clarification was provided.       Patient transported with:   Registered Nurse

## 2022-08-27 NOTE — PROGRESS NOTES
Problem: Pain  Goal: *Control of Pain  Outcome: Progressing Towards Goal     Problem: Patient Education: Go to Patient Education Activity  Goal: Patient/Family Education  Outcome: Progressing Towards Goal     Problem: Falls - Risk of  Goal: *Absence of Falls  Description: Document Darryn Flow Fall Risk and appropriate interventions in the flowsheet. Outcome: Progressing Towards Goal  Note: Fall Risk Interventions:            Medication Interventions: Teach patient to arise slowly                   Problem: Patient Education: Go to Patient Education Activity  Goal: Patient/Family Education  Outcome: Progressing Towards Goal     Problem: Pressure Injury - Risk of  Goal: *Prevention of pressure injury  Description: Document Israel Scale and appropriate interventions in the flowsheet.   Outcome: Progressing Towards Goal  Note: Pressure Injury Interventions:                                            Problem: Patient Education: Go to Patient Education Activity  Goal: Patient/Family Education  Outcome: Progressing Towards Goal     Problem: Vaginal Delivery: Day of Deliver-Laboring  Goal: *Labs within defined limits  Outcome: Progressing Towards Goal     Problem: Vaginal Delivery: Day of Delivery-Post delivery  Goal: Activity/Safety  Outcome: Progressing Towards Goal  Goal: Nutrition/Diet  Outcome: Progressing Towards Goal  Goal: Discharge Planning  Outcome: Progressing Towards Goal  Goal: Treatments/Interventions/Procedures  Outcome: Progressing Towards Goal  Goal: *Vital signs within defined limits  Outcome: Progressing Towards Goal  Goal: *Labs within defined limits  Outcome: Progressing Towards Goal  Goal: *Hemodynamically stable  Outcome: Progressing Towards Goal  Goal: *Demonstrates progressive activity  Outcome: Progressing Towards Goal     Problem: Vaginal Delivery: Postpartum Day 1  Goal: Activity/Safety  Outcome: Progressing Towards Goal  Goal: Consults, if ordered  Outcome: Progressing Towards Goal  Goal: Diagnostic Test/Procedures  Outcome: Progressing Towards Goal  Goal: Treatments/Interventions/Procedures  Outcome: Progressing Towards Goal  Goal: *Labs within defined limits  Outcome: Progressing Towards Goal  Goal: *Hemodynamically stable  Outcome: Progressing Towards Goal

## 2022-08-27 NOTE — PROGRESS NOTES
TRANSFER - IN REPORT:    Verbal report received from Robin Pierre RN(name) on Eduardo Kasper  being received from L&D(unit) for routine progression of care      Report consisted of patients Situation, Background, Assessment and   Recommendations(SBAR). Information from the following report(s) SBAR, Kardex, ED Summary, Procedure Summary, Intake/Output, MAR, Accordion, Recent Results, and Med Rec Status was reviewed with the receiving nurse. Opportunity for questions and clarification was provided. Assessment completed upon patients arrival to unit and care assumed.

## 2022-08-27 NOTE — PROGRESS NOTES
This patient was 30 or more weeks gestation at the time of [facility] go-live. For complete information pertaining to this patient's pregnancy, please refer to the paper chart and ACOG form. Delivery Note    Obstetrician:  Michael Roberts MD    Assistant: none    Pre-Delivery Diagnosis:  pregnancy <37 weeks and Pregnancy complicated by: PPROM    Post-Delivery Diagnosis: Male    Intrapartum Event: Premature Rupture of Membranes (greater than 1 hour prior to onset of labor)    Procedure: Spontaneous vaginal delivery    Epidural: NO    Monitor:  Fetal Heart Tones - External and Uterine Contractions - External    Indications for instrumental delivery: none    Estimated Blood Loss: No data found    Episiotomy: none    Laceration(s):  n/a    Laceration(s) repair: NO    Presentation: Cephalic    Fetal Description: allan    Fetal Position: Occiput Anterior    Birth Weight: pending    Birth Length: pending    Apgar - One Minute: 5    Apgar - Five Minutes: 7    Umbilical Cord: 3 vessels present    Specimens: placenta intact           Complications:  none           Cord Blood Results:   Information for the patient's :  [facility] [[id number]]   No results found for: PCTABR, ABORH, PCTDIG, BILI, ABORH, ABORHEXT   Prenatal Labs:     Lab Results   Component Value Date/Time    ABO/Rh(D) B POSITIVE 2022 12:07 AM    ABO,Rh B pos 2022 12:00 AM    HBsAg, External negative 2022 12:00 AM    HIV, External non reactive 2022 12:00 AM    Rubella, External 1.48 immune 2022 12:00 AM    RPR, External non reactive 2022 12:00 AM    Gonorrhea, External negative 2022 12:00 AM    Chlamydia, External negative 2022 12:00 AM        Attending Attestation: I performed the procedure    Signed By:  Michael Roberts MD     2022

## 2022-08-27 NOTE — L&D DELIVERY NOTE
Delivery Summary  Patient: Hailey Loss             Circumcision:   outpatient  Additional Delivery Comments -   Patient Presented to [facility] with confirmed PPROM, She saw MFM and with shared decision making elected for IOL(she did get BMTZ x 1). She received 1 dose of Buccal Cytotec 50 mcg and progressed from 1cm to c/c/ +2 , delivering VMI over an intact perineum, infant was immediately handed to NICU team . SHe tolerated procedure well. Information for the patient's :  Viri Jon [[id number]]     Labor Events:    Labor: Yes    Steroids: Full Course   Cervical Ripening Date/Time:       Cervical Ripening Type:     Antibiotics During Labor: Yes   Rupture Identifier:      Rupture Date/Time: 2022 5:00 PM   Rupture Type: PPROM   Amniotic Fluid Volume: Scant    Amniotic Fluid Description: Clear    Amniotic Fluid Odor:      Induction: Prostaglandins       Induction Date/Time: 2022 6:35 PM    Indications for Induction:      Augmentation:     Augmentation Date/Time:      Indications for Augmentation:     Labor complications:          Additional complications:        Delivery Events:  Indications For Episiotomy:     Episiotomy: None   Perineal Laceration(s): None   Repaired:     Periurethral Laceration Location:      Repaired:     Labial Laceration Location:     Repaired:     Sulcal Laceration Location:     Repaired:     Vaginal Laceration Location:     Repaired:     Cervical Laceration Location:     Repaired:     Repair Suture:     Number of Repair Packets:     Estimated Blood Loss (ml):  ml   Quantitative Blood Loss (ml)                Delivery Date: 2022    Delivery Time: 1:49 AM  Delivery Type: Vaginal, Spontaneous  Sex:  Male    Gestational Age: 35w7d   Delivery Clinician:  Kamini Andersen  Living Status: Living   Delivery Location: L&D            APGARS  One minute Five minutes Ten minutes   Skin color: 0   0   1     Heart rate: 2   2   2     Grimace: 1   2   2     Muscle tone: 1   2   2     Breathin   1   1     Totals: 5   7   8         Presentation: Vertex    Position:   Occiput    Resuscitation Method:  PPV;Oxygen     Meconium Stained: None      Cord Information: 3 Vessels  Complications: None  Cord around:    Delayed cord clamping?     Cord clamped date/time:2022  1:50 AM  Disposition of Cord Blood: Discard    Blood Gases Sent?: No    Placenta:  Date/Time: 2022  1:55 AM  Removal: Spontaneous      Appearance: Normal     [place] Measurements:  Birth Weight: 2.362 kg      Birth Length: 47.5 cm      Head Circumference: 30.5 cm      Chest Circumference: 29 cm     Abdominal Girth: 26 cm    Other Providers:   ;DEBBIE Martinez;CLEMENTE GUERRA;SAUD MALONEY;;;;JUSTINO WEI.;;;JULISSA PEPPER, Obstetrician;Primary Nurse;Primary [place] Nurse;Nicu Nurse;Neonatologist;Anesthesiologist;Crna;Nurse Practitioner;Midwife;Nursery Nurse;Respiratory Therapist         Cord pH:  none    Episiotomy: None   Laceration(s): None     Estimated Blood Loss (ml): No data found    Labor Events  Method: Prostaglandins      Augmentation:    Cervical Ripening:             Hospital Problems  Never Reviewed            Codes Class Noted POA    Prior poor obstetrical history in third trimester, antepartum ICD-10-CM: O09.293  ICD-9-CM: V23.49  2022 Yes        34 weeks gestation of pregnancy ICD-10-CM: Z3A.34  ICD-9-CM: V22.2  2022 Unknown        * (Principal)  premature rupture of membranes with onset of labor more than 24 hours following rupture in third trimester ICD-10-CM: O42.113  ICD-9-CM: 658.23  2022 Yes           Operative Vaginal Delivery - none    Group B Strep: No results found for: GRBSEXT, GRBSEXT  Information for the patient's :  Tonia Sargent [[id number]]   No results found for: ABORH, PCTABR, PCTDIG, BILI, ABORHEXT, ABORH   No results found for: APH, APCO2, APO2, AHCO3, ABEC, ABDC, O2ST, EPHV, PCO2V, PO2V, HCO3V, EBEV, EBDV, SITE, RSCOM  Delivery Summary    Patient: Michael Monroe MRN: [id number]  SSN: [id number]    YOB: 1990  Age: 27 y.o. Sex: female       Information for the patient's :  Katrin Quintero [[id number]]     Labor Events:    Labor: Yes    Steroids: Full Course   Cervical Ripening Date/Time:       Cervical Ripening Type:     Antibiotics During Labor: Yes   Rupture Identifier:      Rupture Date/Time: 2022 5:00 PM   Rupture Type: PPROM   Amniotic Fluid Volume: Scant    Amniotic Fluid Description: Clear    Amniotic Fluid Odor:      Induction: Prostaglandins       Induction Date/Time: 2022 6:35 PM    Indications for Induction:      Augmentation:     Augmentation Date/Time:      Indications for Augmentation:     Labor complications: Additional complications:        Delivery Events:  Indications For Episiotomy:     Episiotomy: None   Perineal Laceration(s): None   Repaired:     Periurethral Laceration Location:      Repaired:     Labial Laceration Location:     Repaired:     Sulcal Laceration Location:     Repaired:     Vaginal Laceration Location:     Repaired:     Cervical Laceration Location:     Repaired:     Repair Suture:     Number of Repair Packets:     Estimated Blood Loss (ml):  ml   Quantitative Blood Loss (ml)                Delivery Date: 2022    Delivery Time: 1:49 AM  Delivery Type: Vaginal, Spontaneous  Sex:  Male    Gestational Age: 35w7d   Delivery Clinician:  Koki Ward  Living Status: Living   Delivery Location: L&D            APGARS  One minute Five minutes Ten minutes   Skin color:            Heart rate:            Grimace:            Muscle tone:            Breathing: Totals:                Presentation: Vertex    Position:   Occiput    Resuscitation Method:  PPV;Oxygen     Meconium Stained: None      Cord Information: 3 Vessels  Complications: None  Cord around:    Delayed cord clamping?     Cord clamped date/time:2022  1:50 AM  Disposition of Cord Blood: Discard    Blood Gases Sent?: No    Placenta:  Date/Time: 2022  1:55 AM  Removal: Spontaneous      Appearance: Normal     [place] Measurements:  Birth Weight:        Birth Length:        Head Circumference:        Chest Circumference:       Abdominal Girth: Other Providers:   ;Gail CEVALLOS;CLEMENTE GUERRA;SAUD MALONEY;;;;JUSTINO WEI.;;;Rebecca PEPPER, Obstetrician;Primary Nurse;Primary  Nurse;Nicu Nurse;Neonatologist;Anesthesiologist;Crna;Nurse Practitioner;Midwife;Nursery Nurse;Respiratory Therapist         Group B Strep: No results found for: GRBSEXT, GRBSEXT  Information for the patient's :  Lorenzo Lewis [[id number]]   No results found for: ABORH, PCTABR, PCTDIG, BILI, ABORHEXT, ABORH   No results for input(s): PCO2CB, PO2CB, HCO3I, SO2I, IBD, PTEMPI, SPECTI, PHICB, ISITE, IDEV, IALLEN in the last 72 hours.

## 2022-08-28 VITALS
HEIGHT: 68 IN | OXYGEN SATURATION: 97 % | SYSTOLIC BLOOD PRESSURE: 110 MMHG | DIASTOLIC BLOOD PRESSURE: 65 MMHG | BODY MASS INDEX: 28.95 KG/M2 | WEIGHT: 191 LBS | HEART RATE: 74 BPM | TEMPERATURE: 97.3 F | RESPIRATION RATE: 16 BRPM

## 2022-08-28 LAB — BILIRUB SERPL-MCNC: 0.2 MG/DL (ref 0.2–1)

## 2022-08-28 PROCEDURE — 36415 COLL VENOUS BLD VENIPUNCTURE: CPT

## 2022-08-28 PROCEDURE — 74011250637 HC RX REV CODE- 250/637: Performed by: OBSTETRICS & GYNECOLOGY

## 2022-08-28 PROCEDURE — 74011250637 HC RX REV CODE- 250/637: Performed by: ADVANCED PRACTICE MIDWIFE

## 2022-08-28 PROCEDURE — 82247 BILIRUBIN TOTAL: CPT

## 2022-08-28 RX ORDER — ACETAMINOPHEN 325 MG/1
650 TABLET ORAL
Qty: 30 TABLET | Refills: 0 | Status: SHIPPED | OUTPATIENT
Start: 2022-08-28

## 2022-08-28 RX ORDER — IBUPROFEN 800 MG/1
800 TABLET ORAL EVERY 8 HOURS
Qty: 30 TABLET | Refills: 0 | Status: SHIPPED | OUTPATIENT
Start: 2022-08-28

## 2022-08-28 RX ADMIN — HYDROCODONE BITARTRATE AND ACETAMINOPHEN 1 TABLET: 5; 325 TABLET ORAL at 09:43

## 2022-08-28 RX ADMIN — DOCUSATE SODIUM 100 MG: 100 CAPSULE, LIQUID FILLED ORAL at 09:43

## 2022-08-28 RX ADMIN — FERROUS SULFATE TAB 325 MG (65 MG ELEMENTAL FE) 325 MG: 325 (65 FE) TAB at 08:22

## 2022-08-28 RX ADMIN — Medication 1 TABLET: at 08:21

## 2022-08-28 RX ADMIN — HYDROCODONE BITARTRATE AND ACETAMINOPHEN 1 TABLET: 7.5; 325 TABLET ORAL at 05:20

## 2022-08-28 RX ADMIN — IBUPROFEN 800 MG: 400 TABLET, FILM COATED ORAL at 05:31

## 2022-08-28 RX ADMIN — IBUPROFEN 800 MG: 400 TABLET, FILM COATED ORAL at 13:19

## 2022-08-28 RX ADMIN — HYDROCODONE BITARTRATE AND ACETAMINOPHEN 1 TABLET: 5; 325 TABLET ORAL at 13:19

## 2022-08-28 NOTE — DISCHARGE SUMMARY
Obstetrical Discharge Summary     Name: Juanis Russell MRN: [id number]  SSN: [id number]    YOB: 1990  Age: 27 y.o. Sex: female      Admit Date: 2022    Discharge Date: 2022     Admitting Physician: Catracho Jesus MD     Attending Physician:  Jacinda Lizarraga MD     Admission Diagnoses: 34 weeks gestation of pregnancy [Z3A.34]  History of  premature rupture of membranes (PPROM) [Z87.59]    Discharge Diagnoses:   Information for the patient's :   [[id number]]   Delivery of a 2.362 kg male infant via Vaginal, Spontaneous on 2022 at 1:49 AM  by Dahiana Spain. Apgars were 5  and 7 . Additional Diagnoses:   Hospital Problems  Never Reviewed            Codes Class Noted POA    Prior poor obstetrical history in third trimester, antepartum ICD-10-CM: O09.293  ICD-9-CM: V23.49  2022 Yes        34 weeks gestation of pregnancy ICD-10-CM: Z3A.34  ICD-9-CM: V22.2  2022 Unknown        * (Principal)  premature rupture of membranes with onset of labor more than 24 hours following rupture in third trimester ICD-10-CM: O42.113  ICD-9-CM: 658.23  2022 Yes          Lab Results   Component Value Date/Time    Rubella, External 1.48 immune 2022 12:00 AM       Immunization(s):   There is no immunization history on file for this patient. Hospital Course: Normal hospital course following the delivery. Condition on Discharge: good    Disposition: home Home    Patient Instructions:   Current Discharge Medication List        CONTINUE these medications which have NOT CHANGED    Details   prenatal 32/UBVK fum/folic/dha (PRENATAL-1 PO) Take 1 Tablet by mouth daily. ferrous sulfate (Iron) 325 mg (65 mg iron) tablet Take 325 mg by mouth Daily (before breakfast). levETIRAcetam (KEPPRA) 500 mg tablet Take 1 Tablet by mouth two (2) times a day.   Qty: 60 Tablet, Refills: 0           STOP taking these medications       lidocaine (LIDODERM) 5 % Comments:   Reason for Stopping:         Junel Fe 24 1 mg-20 mcg (24)/75 mg (4) tab Comments:   Reason for Stopping:         sertraline (ZOLOFT) 50 mg tablet Comments:   Reason for Stopping:         naloxone (NARCAN) 4 mg/actuation nasal spray Comments:   Reason for Stopping:               Please make followup appointment for 4-6 weeks  Pelvic rest for 6 weeks  Pain medication as prescribed. Use of contraception as discussed. No orders of the defined types were placed in this encounter.        Signed By:  Radha Slade MD     August 28, 2022

## 2022-08-28 NOTE — PROGRESS NOTES
Post Partum Day #1- Vaginal delivery    Patient doing well post-partum without significant complaint. Voiding without difficulty, normal lochia. Vital signs stable, afebrile. Exam:  Patient without distress. Abdomen soft, fundus firm at level of umbilicus, nontender               Perineum - intact with normal lochia               Lower extremities- mild edema but negative Rehan's sign   Breasts- not engorged    Labs: N/A    Impression: PPD #1 S/P  of  VMI after PPROM                     Doing well PP, Pumping Breast milk, w/o PP depression    Assessment and Plan:  Patient appears to be having uncomplicated post-partum course. Continue routine perineal care and maternal education. Plan discharge tomorrow if no problems occur. Blood type checked.      Yair Pérez MD

## 2022-08-28 NOTE — PROGRESS NOTES
[address] [facility] August 28, 2022       RE: Nick Lipscomb      To Whom It May Concern,    This is to certify that Nick Lipscomb was hospitalized from 08/25/2022 to 08/28/2022. Please feel free to contact her primary OB doctor if you have any questions or concerns. Thank you for your assistance in this matter.       Sincerely,  Kita Mckinley RN

## 2022-08-28 NOTE — PROGRESS NOTES
0745: Bedside and Verbal shift change report given to JHOANA (oncoming nurse) by LUIS ALFREDO BUTLER (offgoing nurse). Report included the following information SBAR, Kardex, Intake/Output, MAR, Accordion, Recent Results, and Med Rec Status. 1330: I have reviewed discharge instructions with the patient. The patient verbalized understanding. Discharge medications reviewed with patient and appropriate educational materials and side effects teaching were provided.

## 2022-08-29 LAB
BACTERIA SPEC CULT: NORMAL
SERVICE CMNT-IMP: NORMAL

## 2022-09-06 ENCOUNTER — TELEPHONE (OUTPATIENT)
Dept: MOTHER INFANT UNIT | Age: 32
End: 2022-09-06

## 2022-09-06 NOTE — TELEPHONE ENCOUNTER
Patient states she and the baby are doing well. She has been bottle feeding and the baby is gaining weight. She has no questions or concerns at this time.

## 2022-11-14 NOTE — ED NOTES
Pt D/C by provider and accepted D/C data and plan of care. Patient (s)  given copy of dc instructions and 2 script(s). Patient(s)  verbalized understanding of instructions and script (s). Patient given a current medication reconciliation form and verbalized understanding of their medications. Patient (s) verbalized understanding of the importance of discussing medications with  his or her physician or clinic when they follow up. Patient alert and oriented and in no acute distress. Pt verbalizes pain scale of 4 out of 10. Patient discharged home ambulatory with self. 80

## 2023-03-29 ENCOUNTER — HOSPITAL ENCOUNTER (EMERGENCY)
Age: 33
Discharge: HOME | End: 2023-03-29
Attending: STUDENT IN AN ORGANIZED HEALTH CARE EDUCATION/TRAINING PROGRAM
Payer: MEDICAID

## 2023-03-29 VITALS
WEIGHT: 194.22 LBS | RESPIRATION RATE: 18 BRPM | TEMPERATURE: 97.3 F | HEART RATE: 78 BPM | HEIGHT: 69 IN | BODY MASS INDEX: 28.77 KG/M2 | DIASTOLIC BLOOD PRESSURE: 81 MMHG | OXYGEN SATURATION: 100 % | SYSTOLIC BLOOD PRESSURE: 128 MMHG

## 2023-03-29 DIAGNOSIS — L73.2 AXILLARY HIDRADENITIS SUPPURATIVA: Primary | ICD-10-CM

## 2023-03-29 LAB — HCG UR QL: NEGATIVE

## 2023-03-29 PROCEDURE — 99283 EMERGENCY DEPT VISIT LOW MDM: CPT

## 2023-03-29 PROCEDURE — 81025 URINE PREGNANCY TEST: CPT

## 2023-03-29 PROCEDURE — 74011250636 HC RX REV CODE- 250/636: Performed by: STUDENT IN AN ORGANIZED HEALTH CARE EDUCATION/TRAINING PROGRAM

## 2023-03-29 PROCEDURE — 74011250637 HC RX REV CODE- 250/637: Performed by: STUDENT IN AN ORGANIZED HEALTH CARE EDUCATION/TRAINING PROGRAM

## 2023-03-29 RX ORDER — DEXAMETHASONE 4 MG/1
6 TABLET ORAL ONCE
Status: COMPLETED | OUTPATIENT
Start: 2023-03-29 | End: 2023-03-29

## 2023-03-29 RX ORDER — OXYCODONE HYDROCHLORIDE 5 MG/1
5 TABLET ORAL ONCE
Status: COMPLETED | OUTPATIENT
Start: 2023-03-29 | End: 2023-03-29

## 2023-03-29 RX ORDER — OXYCODONE AND ACETAMINOPHEN 5; 325 MG/1; MG/1
1 TABLET ORAL
Qty: 12 TABLET | Refills: 0 | Status: SHIPPED | OUTPATIENT
Start: 2023-03-29 | End: 2023-04-01

## 2023-03-29 RX ORDER — CLINDAMYCIN PHOSPHATE 10 UG/ML
LOTION TOPICAL 2 TIMES DAILY
Qty: 1 EACH | Refills: 0 | Status: SHIPPED | OUTPATIENT
Start: 2023-03-29 | End: 2023-04-12

## 2023-03-29 RX ORDER — IBUPROFEN 400 MG/1
800 TABLET ORAL
Status: COMPLETED | OUTPATIENT
Start: 2023-03-29 | End: 2023-03-29

## 2023-03-29 RX ORDER — PREDNISONE 50 MG/1
50 TABLET ORAL DAILY
Qty: 3 TABLET | Refills: 0 | Status: SHIPPED | OUTPATIENT
Start: 2023-03-29 | End: 2023-04-01

## 2023-03-29 RX ORDER — IBUPROFEN 800 MG/1
800 TABLET ORAL
Qty: 20 TABLET | Refills: 0 | Status: SHIPPED | OUTPATIENT
Start: 2023-03-29 | End: 2023-04-05

## 2023-03-29 RX ADMIN — OXYCODONE HYDROCHLORIDE 5 MG: 5 TABLET ORAL at 08:48

## 2023-03-29 RX ADMIN — IBUPROFEN 800 MG: 400 TABLET ORAL at 08:48

## 2023-03-29 RX ADMIN — DEXAMETHASONE 6 MG: 4 TABLET ORAL at 08:49

## 2023-03-29 NOTE — ED PROVIDER NOTES
68-year-old female presents for 1 year of bilateral axillary swelling and pain. Patient states swelling comes and goes and occasionally gets better before returning. She has not seen a physician for this previously. Pain has become more severe over the past couple of weeks and she is now having difficulty with her activities of daily life given severity of pain. She denies fever. No lesions elsewhere. Patient has not had antibiotics recently. Has not seen dermatology. The history is provided by the patient. Arm Pain        Past Medical History:   Diagnosis Date    Abnormal Papanicolaou smear of cervix 2021    HPV+' biopsy done    Anemia     Epilepsy ([facility] [address]     last seizure a year ago-not taking meds    Psychiatric problem     anxiety-no meds       No past surgical history on file. No family history on file. Social History     Socioeconomic History    Marital status: SINGLE     Spouse name: Not on file    Number of children: Not on file    Years of education: Not on file    Highest education level: Not on file   Occupational History    Not on file   Tobacco Use    Smoking status: Every Day     Packs/day: 0.25     Types: Cigarettes    Smokeless tobacco: Never   Vaping Use    Vaping Use: Never used   Substance and Sexual Activity    Alcohol use: Not Currently     Comment: \"every other day\"    Drug use: Not Currently     Types: Marijuana     Comment: occ    Sexual activity: Yes     Partners: Male     Birth control/protection: Pill   Other Topics Concern    Not on file   Social History Narrative    Not on file     Social Determinants of Health     Financial Resource Strain: Not on file   Food Insecurity: Not on file   Transportation Needs: Not on file   Physical Activity: Not on file   Stress: Not on file   Social Connections: Not on file   Intimate Partner Violence: Not on file   Housing Stability: Not on file         ALLERGIES: Patient has no known allergies.     Review of Systems   Constitutional: Negative for fever. Respiratory:  Negative for shortness of breath. Cardiovascular:  Negative for chest pain. Gastrointestinal:  Negative for abdominal pain, diarrhea and vomiting. Skin:         See HPI   Hematological:  Positive for adenopathy. Vitals:    03/29/23 0748   BP: 128/81   Pulse: 78   Resp: 18   Temp: 97.3 °F (36.3 °C)   SpO2: 100%   Weight: 88.1 kg (194 lb 3.6 oz)   Height: 5' 9\" (1.753 m)            Physical Exam  Vitals and nursing note reviewed. Constitutional:       General: She is not in acute distress. Appearance: Normal appearance. HENT:      Head: Normocephalic and atraumatic. Nose: No congestion. Mouth/Throat:      Mouth: Mucous membranes are moist.   Eyes:      Conjunctiva/sclera: Conjunctivae normal.   Abdominal:      Tenderness: There is no abdominal tenderness. Skin:     General: Skin is warm and dry. Comments: Bilateral axillary scarring, swelling, cords consistent with HS. No erythema. No open wounds. Neurological:      General: No focal deficit present. Mental Status: She is alert and oriented to person, place, and time. Gait: Gait normal.   Psychiatric:         Mood and Affect: Mood normal.         Behavior: Behavior normal.        Medical Decision Making  Well-appearing nonpregnant 38-year-old female with 1 year of axillary pain bilaterally and exam consistent with hidradenitis suppurativa. Will treat with steroids, topical antibiotics, pain control, dermatology follow-up. Patient agrees with plan of care. Typical medication precautions given. Return precautions given. Risk  Prescription drug management.         LABORATORY TESTS:  Admission on 03/29/2023   Component Date Value Ref Range Status    Pregnancy test,urine (POC) 03/29/2023 Negative  NEG   Final       IMAGING RESULTS:  No orders to display       MEDICATIONS GIVEN:  Medications   oxyCODONE IR (ROXICODONE) tablet 5 mg (5 mg Oral Given 3/29/23 0848)   dexAMETHasone (DECADRON) tablet 6 mg (6 mg Oral Given 3/29/23 0849)   ibuprofen (MOTRIN) tablet 800 mg (800 mg Oral Given 3/29/23 0848)       IMPRESSION:  1. Axillary hidradenitis suppurativa        PLAN:  - Discharge  Current Discharge Medication List        START taking these medications    Details   clindamycin (CLEOCIN T) 1 % lotion Apply  to affected area two (2) times a day for 14 days. use thin film on affected area  Qty: 1 Each, Refills: 0  Start date: 3/29/2023, End date: 4/12/2023      ibuprofen (MOTRIN) 800 mg tablet Take 1 Tablet by mouth every six (6) hours as needed for Pain for up to 7 days. Qty: 20 Tablet, Refills: 0  Start date: 3/29/2023, End date: 4/5/2023      predniSONE (DELTASONE) 50 mg tablet Take 1 Tablet by mouth daily for 3 days. Qty: 3 Tablet, Refills: 0  Start date: 3/29/2023, End date: 4/1/2023      oxyCODONE-acetaminophen (Percocet) 5-325 mg per tablet Take 1 Tablet by mouth every six (6) hours as needed for Pain for up to 3 days. Max Daily Amount: 4 Tablets.   Qty: 12 Tablet, Refills: 0  Start date: 3/29/2023, End date: 4/1/2023    Associated Diagnoses: Axillary hidradenitis suppurativa           Follow-up Information       Follow up With Specialties Details Why Contact Info    Dermatology [address] [place] [address]  Schedule an appointment as soon as possible for a visit in 2 days  [address]  [phone]          Return precautions given    Getachew Mckinley MD         Procedures

## 2023-03-29 NOTE — ED TRIAGE NOTES
Patient arrived ambulatory with c/o bilateral axillary pain x 1 year. Patient hasn't seen a dermatologist since pain began. No known medical conditions.

## 2023-03-29 NOTE — Clinical Note
[address]  [address] [facility] [address]  [phone]    Work/School Note    Date: 3/29/2023    To Whom It May concern:    Noman Munoz was seen and treated today in the emergency room by the following provider(s):  Attending Provider: Rena Romo MD.      Noman Munoz is excused from work/school on 03/29/23 and 03/30/23. She is medically clear to return to work/school on 3/31/2023. May return earlier if feeling better.      Sincerely,          Pio Brandt MD

## 2023-03-29 NOTE — DISCHARGE INSTRUCTIONS
Please be VERY careful with Tylenol as this is a very dangerous medication in overdose. The maximum dose of Tylenol is 1000 mg every 6 hours. Taking more than this can damage your liver and can be life threatening.

## 2023-04-28 ENCOUNTER — HOSPITAL ENCOUNTER (EMERGENCY)
Age: 33
Discharge: HOME | End: 2023-04-28
Attending: EMERGENCY MEDICINE
Payer: MEDICAID

## 2023-04-28 VITALS
OXYGEN SATURATION: 100 % | DIASTOLIC BLOOD PRESSURE: 99 MMHG | RESPIRATION RATE: 15 BRPM | BODY MASS INDEX: 27.77 KG/M2 | SYSTOLIC BLOOD PRESSURE: 134 MMHG | HEART RATE: 78 BPM | WEIGHT: 188.05 LBS | TEMPERATURE: 97 F

## 2023-04-28 DIAGNOSIS — N76.0 BV (BACTERIAL VAGINOSIS): ICD-10-CM

## 2023-04-28 DIAGNOSIS — B96.89 BV (BACTERIAL VAGINOSIS): ICD-10-CM

## 2023-04-28 DIAGNOSIS — L73.2 HYDRADENITIS: Primary | ICD-10-CM

## 2023-04-28 LAB
ALBUMIN SERPL-MCNC: 3.8 G/DL (ref 3.5–5)
ALBUMIN/GLOB SERPL: 0.9 (ref 1.1–2.2)
ALP SERPL-CCNC: 64 U/L (ref 45–117)
ALT SERPL-CCNC: 25 U/L (ref 12–78)
ANION GAP SERPL CALC-SCNC: 5 MMOL/L (ref 5–15)
APPEARANCE UR: ABNORMAL
AST SERPL-CCNC: 16 U/L (ref 15–37)
ATRIAL RATE: 83 BPM
BACTERIA URNS QL MICRO: ABNORMAL /HPF
BASOPHILS # BLD: 0 K/UL (ref 0–0.1)
BASOPHILS NFR BLD: 1 % (ref 0–1)
BILIRUB SERPL-MCNC: 0.5 MG/DL (ref 0.2–1)
BILIRUB UR QL: NEGATIVE
BUN SERPL-MCNC: 11 MG/DL (ref 6–20)
BUN/CREAT SERPL: 15 (ref 12–20)
CALCIUM SERPL-MCNC: 9.3 MG/DL (ref 8.5–10.1)
CALCULATED P AXIS, ECG09: 50 DEGREES
CALCULATED R AXIS, ECG10: 66 DEGREES
CALCULATED T AXIS, ECG11: 73 DEGREES
CHLORIDE SERPL-SCNC: 108 MMOL/L (ref 97–108)
CLUE CELLS VAG QL WET PREP: NORMAL
CO2 SERPL-SCNC: 25 MMOL/L (ref 21–32)
COLOR UR: ABNORMAL
COMMENT, HOLDF: NORMAL
CREAT SERPL-MCNC: 0.71 MG/DL (ref 0.55–1.02)
DIAGNOSIS, 93000: NORMAL
DIFFERENTIAL METHOD BLD: ABNORMAL
EOSINOPHIL # BLD: 0.3 K/UL (ref 0–0.4)
EOSINOPHIL NFR BLD: 7 % (ref 0–7)
EPITH CASTS URNS QL MICRO: ABNORMAL /LPF
ERYTHROCYTE [DISTWIDTH] IN BLOOD BY AUTOMATED COUNT: 12.1 % (ref 11.5–14.5)
GLOBULIN SER CALC-MCNC: 4.3 G/DL (ref 2–4)
GLUCOSE SERPL-MCNC: 91 MG/DL (ref 65–100)
GLUCOSE UR STRIP.AUTO-MCNC: NEGATIVE MG/DL
HCG UR QL: NEGATIVE
HCG UR QL: NEGATIVE
HCT VFR BLD AUTO: 41.5 % (ref 35–47)
HGB BLD-MCNC: 14.1 G/DL (ref 11.5–16)
HGB UR QL STRIP: NEGATIVE
HYALINE CASTS URNS QL MICRO: ABNORMAL /LPF (ref 0–5)
IMM GRANULOCYTES # BLD AUTO: 0 K/UL
IMM GRANULOCYTES NFR BLD AUTO: 0 %
KETONES UR QL STRIP.AUTO: NEGATIVE MG/DL
KOH PREP SPEC: NORMAL
LEUKOCYTE ESTERASE UR QL STRIP.AUTO: ABNORMAL
LYMPHOCYTES # BLD: 1.8 K/UL (ref 0.8–3.5)
LYMPHOCYTES NFR BLD: 42 % (ref 12–49)
MCH RBC QN AUTO: 32.6 PG (ref 26–34)
MCHC RBC AUTO-ENTMCNC: 34 G/DL (ref 30–36.5)
MCV RBC AUTO: 96.1 FL (ref 80–99)
MONOCYTES # BLD: 0.4 K/UL (ref 0–1)
MONOCYTES NFR BLD: 10 % (ref 5–13)
NEUTS SEG # BLD: 1.7 K/UL (ref 1.8–8)
NEUTS SEG NFR BLD: 40 % (ref 32–75)
NITRITE UR QL STRIP.AUTO: NEGATIVE
NRBC # BLD: 0 K/UL (ref 0–0.01)
NRBC BLD-RTO: 0 PER 100 WBC
P-R INTERVAL, ECG05: 164 MS
PH UR STRIP: 6.5 (ref 5–8)
PLATELET # BLD AUTO: 310 K/UL (ref 150–400)
PMV BLD AUTO: 9.2 FL (ref 8.9–12.9)
POTASSIUM SERPL-SCNC: 3.9 MMOL/L (ref 3.5–5.1)
PROT SERPL-MCNC: 8.1 G/DL (ref 6.4–8.2)
PROT UR STRIP-MCNC: NEGATIVE MG/DL
Q-T INTERVAL, ECG07: 378 MS
QRS DURATION, ECG06: 84 MS
QTC CALCULATION (BEZET), ECG08: 444 MS
RBC # BLD AUTO: 4.32 M/UL (ref 3.8–5.2)
RBC #/AREA URNS HPF: ABNORMAL /HPF (ref 0–5)
RBC MORPH BLD: ABNORMAL
SAMPLES BEING HELD,HOLD: NORMAL
SERVICE CMNT-IMP: NORMAL
SODIUM SERPL-SCNC: 138 MMOL/L (ref 136–145)
SP GR UR REFRACTOMETRY: 1.02 (ref 1–1.03)
T VAGINALIS VAG QL WET PREP: NORMAL
UA: UC IF INDICATED,UAUC: ABNORMAL
UROBILINOGEN UR QL STRIP.AUTO: 1 EU/DL (ref 0.2–1)
VENTRICULAR RATE, ECG03: 83 BPM
WBC # BLD AUTO: 4.2 K/UL (ref 3.6–11)
WBC URNS QL MICRO: ABNORMAL /HPF (ref 0–4)

## 2023-04-28 PROCEDURE — 85025 COMPLETE CBC W/AUTO DIFF WBC: CPT

## 2023-04-28 PROCEDURE — 36415 COLL VENOUS BLD VENIPUNCTURE: CPT

## 2023-04-28 PROCEDURE — 80053 COMPREHEN METABOLIC PANEL: CPT

## 2023-04-28 PROCEDURE — 96374 THER/PROPH/DIAG INJ IV PUSH: CPT

## 2023-04-28 PROCEDURE — 99284 EMERGENCY DEPT VISIT MOD MDM: CPT

## 2023-04-28 PROCEDURE — 81001 URINALYSIS AUTO W/SCOPE: CPT

## 2023-04-28 PROCEDURE — 81025 URINE PREGNANCY TEST: CPT

## 2023-04-28 PROCEDURE — 93005 ELECTROCARDIOGRAM TRACING: CPT

## 2023-04-28 PROCEDURE — 87210 SMEAR WET MOUNT SALINE/INK: CPT

## 2023-04-28 PROCEDURE — 74011250636 HC RX REV CODE- 250/636

## 2023-04-28 PROCEDURE — 87491 CHLMYD TRACH DNA AMP PROBE: CPT

## 2023-04-28 RX ORDER — TRAMADOL HYDROCHLORIDE 50 MG/1
50 TABLET ORAL
Qty: 12 TABLET | Refills: 0 | Status: SHIPPED | OUTPATIENT
Start: 2023-04-28 | End: 2023-04-28 | Stop reason: SDUPTHER

## 2023-04-28 RX ORDER — TRAMADOL HYDROCHLORIDE 50 MG/1
50 TABLET ORAL
Qty: 12 TABLET | Refills: 0 | Status: SHIPPED | OUTPATIENT
Start: 2023-04-28 | End: 2023-05-01

## 2023-04-28 RX ORDER — KETOROLAC TROMETHAMINE 30 MG/ML
15 INJECTION, SOLUTION INTRAMUSCULAR; INTRAVENOUS
Status: COMPLETED | OUTPATIENT
Start: 2023-04-28 | End: 2023-04-28

## 2023-04-28 RX ORDER — CHLORHEXIDINE GLUCONATE 4 G/100ML
SOLUTION TOPICAL
Qty: 118 ML | Refills: 0 | Status: SHIPPED | OUTPATIENT
Start: 2023-04-28

## 2023-04-28 RX ORDER — DOXYCYCLINE HYCLATE 100 MG
100 TABLET ORAL 2 TIMES DAILY
Qty: 14 TABLET | Refills: 0 | Status: SHIPPED | OUTPATIENT
Start: 2023-04-28 | End: 2023-05-05

## 2023-04-28 RX ORDER — METRONIDAZOLE 500 MG/1
500 TABLET ORAL 2 TIMES DAILY
Qty: 14 TABLET | Refills: 0 | Status: SHIPPED | OUTPATIENT
Start: 2023-04-28 | End: 2023-05-05

## 2023-04-28 RX ORDER — DOXYCYCLINE HYCLATE 100 MG
100 TABLET ORAL 2 TIMES DAILY
Qty: 14 TABLET | Refills: 0 | Status: SHIPPED | OUTPATIENT
Start: 2023-04-28 | End: 2023-04-28 | Stop reason: SDUPTHER

## 2023-04-28 RX ORDER — METRONIDAZOLE 500 MG/1
500 TABLET ORAL 2 TIMES DAILY
Qty: 14 TABLET | Refills: 0 | Status: SHIPPED | OUTPATIENT
Start: 2023-04-28 | End: 2023-04-28 | Stop reason: SDUPTHER

## 2023-04-28 RX ORDER — CHLORHEXIDINE GLUCONATE 4 G/100ML
SOLUTION TOPICAL
Qty: 118 ML | Refills: 0 | Status: SHIPPED | OUTPATIENT
Start: 2023-04-28 | End: 2023-04-28 | Stop reason: SDUPTHER

## 2023-04-28 RX ADMIN — KETOROLAC TROMETHAMINE 15 MG: 30 INJECTION, SOLUTION INTRAMUSCULAR; INTRAVENOUS at 11:58

## 2023-04-28 RX ADMIN — SODIUM CHLORIDE 1000 ML: 9 INJECTION, SOLUTION INTRAVENOUS at 10:22

## 2023-04-28 NOTE — ED NOTES
approached triage nurse asking about time. Pt states she does not want to get stuck for an IV. Pt currently eating breakfast in the lobby.

## 2023-04-28 NOTE — ED TRIAGE NOTES
Arrives ambulatory with steady gait from home for left axillary hidradenitis flare. States she passed out 3 separate times this morning and fell to ground.  witnessed. Pt also mentions she is having vaginal discharge \"It smells like rotten eggs. \"

## 2023-04-28 NOTE — ED PROVIDER NOTES
Skin Problem     Syncope   Associated symptoms include weakness. Pertinent negatives include no chest pain, no confusion, no fever, no abdominal pain, no nausea, no vomiting, no congestion, no headaches and no light-headedness. Her past medical history is significant for syncope. Loan Maradiaga is a 27 y.o. female with Hx of hydradenitis suppurativa who presents ambulatory with  to [facility] ED with cc of HS flare. Patient notes swelling, pruritus and severe pain to bilateral axillas. She notes bloody drainage from L axilla a few days ago. She notes 3 syncopal episodes this morning secondary to axillary pain; hit head and scraped L elbow. She notes 3 days of clear vaginal discharge that \"smells like rotten eggs\". Denies fever, chills nausea vomiting, pain elsewhere, dizziness, lightheadedness, dyspnea, abdominal pain, dyspnea, current drainage from axillas, any other concerns at this time. Upon chart review, was seen last month for bilateral axillary swelling and pain x 1 year. Diagnosed with HS and given steroids, topical antibiotics, pain control and dermatology follow-up. Patient notes that soonest dermatology appointment she is able to get this in January. PCP: None    There are no other complaints, changes or physical findings at this time. Past Medical History:   Diagnosis Date    Abnormal Papanicolaou smear of cervix 2021    HPV+' biopsy done    Anemia     Epilepsy ([facility] [address]     last seizure a year ago-not taking meds    Psychiatric problem     anxiety-no meds       No past surgical history on file. No family history on file.     Social History     Socioeconomic History    Marital status: SINGLE     Spouse name: Not on file    Number of children: Not on file    Years of education: Not on file    Highest education level: Not on file   Occupational History    Not on file   Tobacco Use    Smoking status: Every Day     Packs/day: 0.25     Types: Cigarettes    Smokeless tobacco: Never Vaping Use    Vaping Use: Never used   Substance and Sexual Activity    Alcohol use: Not Currently     Comment: \"every other day\"    Drug use: Not Currently     Types: Marijuana     Comment: occ    Sexual activity: Yes     Partners: Male     Birth control/protection: Pill   Other Topics Concern    Not on file   Social History Narrative    Not on file     Social Determinants of Health     Financial Resource Strain: Not on file   Food Insecurity: Not on file   Transportation Needs: Not on file   Physical Activity: Not on file   Stress: Not on file   Social Connections: Not on file   Intimate Partner Violence: Not on file   Housing Stability: Not on file         ALLERGIES: Patient has no known allergies. Review of Systems   Constitutional:  Negative for activity change, appetite change, chills and fever. HENT:  Negative for congestion, rhinorrhea and sore throat. Respiratory:  Negative for cough and shortness of breath. Cardiovascular:  Positive for syncope. Negative for chest pain. Gastrointestinal:  Negative for abdominal pain, nausea and vomiting. Genitourinary:  Positive for vaginal discharge. Negative for decreased urine volume and difficulty urinating. Musculoskeletal:  Negative for arthralgias and myalgias. Skin:  Positive for wound. Negative for color change and rash. Neurological:  Positive for syncope and weakness. Negative for light-headedness and headaches. Psychiatric/Behavioral:  Negative for agitation and confusion. The patient is not nervous/anxious. Vitals:    04/28/23 0826 04/28/23 1149   BP: (!) 133/102 (!) 134/99   Pulse: 89 78   Resp: 16 15   Temp: 97 °F (36.1 °C)    SpO2: 99% 100%   Weight: 85.3 kg (188 lb 0.8 oz)             Physical Exam  Vitals and nursing note reviewed. Constitutional:       Appearance: Normal appearance. HENT:      Head: Normocephalic and atraumatic.       Right Ear: External ear normal.      Left Ear: External ear normal.      Nose: Nose normal.      Mouth/Throat:      Mouth: Mucous membranes are moist.   Eyes:      Extraocular Movements: Extraocular movements intact. Conjunctiva/sclera: Conjunctivae normal.      Pupils: Pupils are equal, round, and reactive to light. Cardiovascular:      Rate and Rhythm: Normal rate and regular rhythm. Pulses: Normal pulses. Heart sounds: Normal heart sounds. Pulmonary:      Effort: Pulmonary effort is normal.      Breath sounds: Normal breath sounds. Abdominal:      Palpations: Abdomen is soft. Musculoskeletal:         General: Normal range of motion. Cervical back: Normal range of motion and neck supple. Skin:     General: Skin is warm and dry. Capillary Refill: Capillary refill takes less than 2 seconds. Comments: Bilateral axillas: Diffuse tenderness to palpation. Multiple areas of scar tissue noted. No erythema, edema, fluctuance, suspicion for abscess, drainage, bleeding. Neurological:      General: No focal deficit present. Mental Status: She is alert and oriented to person, place, and time. Mental status is at baseline. Psychiatric:         Mood and Affect: Mood normal.         Behavior: Behavior normal.         Thought Content: Thought content normal.         Judgment: Judgment normal.        Medical Decision Making  Ddx: Hidradenitis suppurativa, bacterial vaginosis, and others    25-year-old female presents with at bedtime flare in bilateral axilla for several days, as well as several days of malodorous vaginal discharge. Afebrile, VSS. On exam there are no abscesses in axillas amenable to drainage. Blood work, EKG and urine ordered as patient reported syncopal episodes earlier; work-up largely unremarkable. Clue cells noted on self swab. Discharged with Hibiclens, doxycycline for at bedtime, Flagyl for BV, dermatology follow-up, strict return precautions, small pain medication for severe pain.     Amount and/or Complexity of Data Reviewed  Labs: ordered. Decision-making details documented in ED Course. ECG/medicine tests: ordered. Decision-making details documented in ED Course. Risk  OTC drugs. Prescription drug management. ED Course as of 04/28/23 1632   Fri Apr 28, 2023   0953 EKG 0 939  Independent evaluation shows normal sinus rhythm at 83 bpm with normal axis and intervals. No STEMI [GG]      ED Course User Index  [GG] Harpal Gloria,        Procedures    LABORATORY TESTS:  Recent Results (from the past 12 hour(s))   EKG, 12 LEAD, INITIAL    Collection Time: 04/28/23  9:39 AM   Result Value Ref Range    Ventricular Rate 83 BPM    Atrial Rate 83 BPM    P-R Interval 164 ms    QRS Duration 84 ms    Q-T Interval 378 ms    QTC Calculation (Bezet) 444 ms    Calculated P Axis 50 degrees    Calculated R Axis 66 degrees    Calculated T Axis 73 degrees    Diagnosis       Normal sinus rhythm  T wave abnormality, consider anterior ischemia  Abnormal ECG  When compared with ECG of 04-JUN-2021 16:57,  No significant change was found  Confirmed by Judy Villalba MD [id number]) on 4/28/2023 2:11:53 PM     HCG URINE, QL. - POC    Collection Time: 04/28/23 10:17 AM   Result Value Ref Range    Pregnancy test,urine (POC) Negative NEG     CBC WITH AUTOMATED DIFF    Collection Time: 04/28/23 10:20 AM   Result Value Ref Range    WBC 4.2 3.6 - 11.0 K/uL    RBC 4.32 3.80 - 5.20 M/uL    HGB 14.1 11.5 - 16.0 g/dL    HCT 41.5 35.0 - 47.0 %    MCV 96.1 80.0 - 99.0 FL    MCH 32.6 26.0 - 34.0 PG    MCHC 34.0 30.0 - 36.5 g/dL    RDW 12.1 11.5 - 14.5 %    PLATELET 445 111 - 500 K/uL    MPV 9.2 8.9 - 12.9 FL    NRBC 0.0 0  WBC    ABSOLUTE NRBC 0.00 0.00 - 0.01 K/uL    NEUTROPHILS 40 32 - 75 %    LYMPHOCYTES 42 12 - 49 %    MONOCYTES 10 5 - 13 %    EOSINOPHILS 7 0 - 7 %    BASOPHILS 1 0 - 1 %    IMMATURE GRANULOCYTES 0 %    ABS. NEUTROPHILS 1.7 (L) 1.8 - 8.0 K/UL    ABS. LYMPHOCYTES 1.8 0.8 - 3.5 K/UL    ABS. MONOCYTES 0.4 0.0 - 1.0 K/UL    ABS.  EOSINOPHILS 0.3 0.0 - 0.4 K/UL    ABS. BASOPHILS 0.0 0.0 - 0.1 K/UL    ABS. IMM. GRANS. 0.0 K/UL    DF MANUAL      RBC COMMENTS NORMOCYTIC, NORMOCHROMIC     METABOLIC PANEL, COMPREHENSIVE    Collection Time: 04/28/23 10:20 AM   Result Value Ref Range    Sodium 138 136 - 145 mmol/L    Potassium 3.9 3.5 - 5.1 mmol/L    Chloride 108 97 - 108 mmol/L    CO2 25 21 - 32 mmol/L    Anion gap 5 5 - 15 mmol/L    Glucose 91 65 - 100 mg/dL    BUN 11 6 - 20 MG/DL    Creatinine 0.71 0.55 - 1.02 MG/DL    BUN/Creatinine ratio 15 12 - 20      eGFR >60 >60 ml/min/1.73m2    Calcium 9.3 8.5 - 10.1 MG/DL    Bilirubin, total 0.5 0.2 - 1.0 MG/DL    ALT (SGPT) 25 12 - 78 U/L    AST (SGOT) 16 15 - 37 U/L    Alk. phosphatase 64 45 - 117 U/L    Protein, total 8.1 6.4 - 8.2 g/dL    Albumin 3.8 3.5 - 5.0 g/dL    Globulin 4.3 (H) 2.0 - 4.0 g/dL    A-G Ratio 0.9 (L) 1.1 - 2.2     SAMPLES BEING HELD    Collection Time: 04/28/23 10:20 AM   Result Value Ref Range    SAMPLES BEING HELD 1RED 1BLUE     COMMENT        Add-on orders for these samples will be processed based on acceptable specimen integrity and analyte stability, which may vary by analyte.    URINALYSIS W/ REFLEX CULTURE    Collection Time: 04/28/23 10:20 AM    Specimen: Urine   Result Value Ref Range    Color YELLOW/STRAW      Appearance CLOUDY (A) CLEAR      Specific gravity 1.023 1.003 - 1.030      pH (UA) 6.5 5.0 - 8.0      Protein Negative NEG mg/dL    Glucose Negative NEG mg/dL    Ketone Negative NEG mg/dL    Bilirubin Negative NEG      Blood Negative NEG      Urobilinogen 1.0 0.2 - 1.0 EU/dL    Nitrites Negative NEG      Leukocyte Esterase SMALL (A) NEG      UA:UC IF INDICATED CULTURE NOT INDICATED BY UA RESULT CNI      WBC 5-10 0 - 4 /hpf    RBC 0-5 0 - 5 /hpf    Epithelial cells MANY (A) FEW /lpf    Bacteria 3+ (A) NEG /hpf    Hyaline cast 0-2 0 - 5 /lpf   WET PREP    Collection Time: 04/28/23 10:20 AM    Specimen: Miscellaneous sample   Result Value Ref Range    Clue cells CLUE CELLS PRESENT      Wet prep NO TRICHOMONAS SEEN     KOH, OTHER SOURCES    Collection Time: 04/28/23 10:20 AM    Specimen: Vagina; Other   Result Value Ref Range    Special Requests: NO SPECIAL REQUESTS      KOH NO YEAST SEEN     HCG URINE, QL. - POC    Collection Time: 04/28/23 11:38 AM   Result Value Ref Range    Pregnancy test,urine (POC) Negative NEG         IMAGING RESULTS:  No orders to display       MEDICATIONS GIVEN:  Medications   sodium chloride 0.9 % bolus infusion 1,000 mL (0 mL IntraVENous IV Completed 4/28/23 1122)   ketorolac (TORADOL) injection 15 mg (15 mg IntraVENous Given 4/28/23 1158)       IMPRESSION:  1. Hydradenitis    2. BV (bacterial vaginosis)        PLAN:  1. Discharge Medication List as of 4/28/2023 11:58 AM        START taking these medications    Details   chlorhexidine (Hibiclens) 4 % liquid Wash the affected area(s) daily with a small amount of this soap, Normal, Disp-118 mL, R-0      metroNIDAZOLE (FlagyL) 500 mg tablet Take 1 Tablet by mouth two (2) times a day for 7 days. , Normal, Disp-14 Tablet, R-0      doxycycline (VIBRA-TABS) 100 mg tablet Take 1 Tablet by mouth two (2) times a day for 7 days. , Normal, Disp-14 Tablet, R-0      traMADoL (Ultram) 50 mg tablet Take 1 Tablet by mouth every six (6) hours as needed for Pain for up to 3 days. Max Daily Amount: 200 mg., Normal, Disp-12 Tablet, R-0           CONTINUE these medications which have NOT CHANGED    Details   prenatal 38/BGAI fum/folic/dha (PRENATAL-1 PO) Take 1 Tablet by mouth daily. , Historical Med      ferrous sulfate (Iron) 325 mg (65 mg iron) tablet Take 325 mg by mouth Daily (before breakfast). , Historical Med      levETIRAcetam (KEPPRA) 500 mg tablet Take 1 Tablet by mouth two (2) times a day., Normal, Disp-60 Tablet, R-0           2.    Follow-up Information       Follow up With Specialties Details Why Contact Info    [address] [facility] [address] DEP Emergency Medicine Go to  As needed, If symptoms worsen Patrice Wilkes [address] [facility] Primary Care Schedule an appointment as soon as possible for a visit  As needed, To establish primary care doctor [address] [facility] [place] [address] [facility] [address]     [facility] [address] [facility]  Schedule an appointment as soon as possible for a visit   [address] [facility]          3.  Return to ED if worse

## 2023-04-28 NOTE — DISCHARGE INSTRUCTIONS
You have bacterial vaginosis. You are being prescribed antibiotics for this. You also most likely have a skin condition known as hydradenitis. You are being prescribed an oral antibiotic and an antibiotic soap. You should follow up with a dermatologist for this. Also follow up with your PCP. A referral is being made if you do not have one. Return to ER if you experience severe or worsening symptoms. You may take Ibuprofen 800mg every 6-8 hours as needed for pain. You may also alternate with Tylenol 1000mg every 6-8 hours as needed for pain. You are being prescribed a small amount of narcotic pain medication for severe pain.

## 2023-04-30 LAB
C TRACH RRNA SPEC QL NAA+PROBE: NEGATIVE
N GONORRHOEA RRNA SPEC QL NAA+PROBE: NEGATIVE
SPECIMEN SOURCE: NORMAL

## 2023-06-07 ENCOUNTER — HOSPITAL ENCOUNTER (EMERGENCY)
Facility: HOSPITAL | Age: 33
Discharge: HOME | End: 2023-06-07
Attending: EMERGENCY MEDICINE

## 2023-06-07 VITALS
TEMPERATURE: 97 F | DIASTOLIC BLOOD PRESSURE: 108 MMHG | OXYGEN SATURATION: 100 % | BODY MASS INDEX: 27.97 KG/M2 | WEIGHT: 189.38 LBS | RESPIRATION RATE: 18 BRPM | HEART RATE: 77 BPM | SYSTOLIC BLOOD PRESSURE: 171 MMHG

## 2023-06-07 DIAGNOSIS — L73.2 HIDRADENITIS SUPPURATIVA OF RIGHT AXILLA: Primary | ICD-10-CM

## 2023-06-07 LAB
COMMENT:: NORMAL
SPECIMEN HOLD: NORMAL

## 2023-06-07 PROCEDURE — 6370000000 HC RX 637 (ALT 250 FOR IP): Performed by: NURSE PRACTITIONER

## 2023-06-07 PROCEDURE — 99283 EMERGENCY DEPT VISIT LOW MDM: CPT

## 2023-06-07 RX ORDER — OXYCODONE HYDROCHLORIDE AND ACETAMINOPHEN 5; 325 MG/1; MG/1
1 TABLET ORAL
Status: COMPLETED | OUTPATIENT
Start: 2023-06-07 | End: 2023-06-07

## 2023-06-07 RX ORDER — PREDNISONE 20 MG/1
50 TABLET ORAL
Status: COMPLETED | OUTPATIENT
Start: 2023-06-07 | End: 2023-06-07

## 2023-06-07 RX ORDER — CLINDAMYCIN PHOSPHATE 10 UG/ML
LOTION TOPICAL
Qty: 60 G | Refills: 0 | Status: SHIPPED | OUTPATIENT
Start: 2023-06-07 | End: 2023-07-07

## 2023-06-07 RX ORDER — IBUPROFEN 600 MG/1
600 TABLET ORAL 4 TIMES DAILY PRN
Qty: 40 TABLET | Refills: 0 | Status: SHIPPED | OUTPATIENT
Start: 2023-06-07

## 2023-06-07 RX ORDER — DOXYCYCLINE HYCLATE 100 MG
100 TABLET ORAL DAILY
Qty: 60 TABLET | Refills: 0 | Status: SHIPPED | OUTPATIENT
Start: 2023-06-07 | End: 2023-08-06

## 2023-06-07 RX ORDER — OXYCODONE HYDROCHLORIDE AND ACETAMINOPHEN 5; 325 MG/1; MG/1
1 TABLET ORAL EVERY 6 HOURS PRN
Qty: 12 TABLET | Refills: 0 | Status: SHIPPED | OUTPATIENT
Start: 2023-06-07 | End: 2023-06-10

## 2023-06-07 RX ORDER — PREDNISONE 50 MG/1
50 TABLET ORAL DAILY
Qty: 3 TABLET | Refills: 0 | Status: SHIPPED | OUTPATIENT
Start: 2023-06-07 | End: 2023-06-10

## 2023-06-07 RX ADMIN — PREDNISONE 50 MG: 20 TABLET ORAL at 09:31

## 2023-06-07 RX ADMIN — OXYCODONE HYDROCHLORIDE AND ACETAMINOPHEN 1 TABLET: 5; 325 TABLET ORAL at 09:31

## 2023-06-07 ASSESSMENT — PAIN - FUNCTIONAL ASSESSMENT: PAIN_FUNCTIONAL_ASSESSMENT: 0-10

## 2023-06-07 ASSESSMENT — PAIN DESCRIPTION - LOCATION: LOCATION: ARM

## 2023-06-07 ASSESSMENT — ENCOUNTER SYMPTOMS
RESPIRATORY NEGATIVE: 1
GASTROINTESTINAL NEGATIVE: 1

## 2023-06-07 ASSESSMENT — PAIN SCALES - GENERAL: PAINLEVEL_OUTOF10: 10

## 2023-06-07 ASSESSMENT — PAIN DESCRIPTION - ORIENTATION: ORIENTATION: RIGHT

## 2023-06-07 ASSESSMENT — PAIN DESCRIPTION - DESCRIPTORS: DESCRIPTORS: ACHING

## 2023-06-07 NOTE — DISCHARGE INSTRUCTIONS
When you go to the pharmacy to  the antibiotics please only take doxycycline by mouth or apply the topical clindamycin depending on your insurance coverage. Do not use both antibiotics. Both prescriptions were sent in the event that you could not afford the topical, please take the oral.  Please call general surgery today to schedule a follow-up appointment, do not drink alcohol or operate cars while taking the Percocet, ensure that you are not getting constipated, using over-the-counter MiraLAX as needed, alternate Tylenol with ibuprofen every 4-6 hours to help with the pain. You may use Hibiclens over-the-counter when showering, use only 1 areas that are affected by the hidradenitis suppurative, under your armpits, upper abdomen. The prednisone prescription you will start tomorrow as you received the first dose while in the ER. Thank you for allowing us to provide you with medical care today. We realize that you have many choices for your emergency care needs. We thank you for choosing [facility]. Please choose us in the future for any continued health care needs. We hope we addressed all of your medical concerns. We strive to provide excellent quality care in the Emergency Department. Anything less than excellent does not meet our expectations. The exam and treatment you received in the Emergency Department were for an emergent problem and are not intended as complete care. It is important that you follow up with a doctor, nurse practitioner, or [phone] assistant for ongoing care. If your symptoms worsen or you do not improve as expected and you are unable to reach your usual health care provider, you should return to the Emergency Department. We are available 24 hours a day. Take this sheet with you when you go to your follow-up visit. If you have any problem arranging the follow-up visit, contact the Emergency Department immediately.      Make an

## 2023-06-07 NOTE — ED TRIAGE NOTES
Pt arrives ambulatory to triage w/ complaint of under arm swelling/ pain. Pt states she sees a specialist and dermatologist for this issues but has been unable to get appointment to see them. Pt states that tylenol is not relieving the pain and it is worse on the R side.

## 2023-06-07 NOTE — ED PROVIDER NOTES
Capillary Refill: Capillary refill takes less than 2 seconds. Findings: Abscess present. Comments: Hardened abscess under the right axillae, tender to palpation with mild swelling, no erythema, small single white pustule,no active drainage, hard abscess extends in multiple directions under the right axillae. Similar appearance to the left axillae with no active abscess. Neurological:      General: No focal deficit present. Mental Status: She is alert. Psychiatric:         Mood and Affect: Mood normal.         Behavior: Behavior normal.         Thought Content: Thought content normal.       DIAGNOSTIC RESULTS     EKG: All EKG's are interpreted by the Emergency Department Physician who either signs or Co-signs this chart in the absence of a cardiologist.        RADIOLOGY:   Non-plain film images such as CT, Ultrasound and MRI are read by the radiologist. Plain radiographic images are visualized and preliminarily interpreted by the emergency physician with the below findings:        Interpretation per the Radiologist below, if available at the time of this note:    No orders to display        LABS:  [address]       All other labs were within normal range or not returned as of this dictation. EMERGENCY DEPARTMENT COURSE and DIFFERENTIAL DIAGNOSIS/MDM:   Vitals:    Vitals:    06/07/23 0729   BP: (!) 149/105   Pulse: 81   Resp: 18   Temp: 97 °F (36.1 °C)   TempSrc: Temporal   SpO2: 100%   Weight: 85.9 kg (189 lb 6 oz)           Medical Decision Making  Differential DX: hidradenitis suppurative vs abscess vs cellulitis     1. Previous notes (external to Emergency room) were reviewed: Chart Review    2. History was obtained by: Patient    3. Chronic medical issues affecting this visit: hidradenitis suppurative    4. I ordered the following tests, followed by review of final reads by lab and radiology: none    5. I considered ordering: cbc, cmp, ct right upper arm    6.  Medical

## 2024-01-18 ENCOUNTER — HOSPITAL ENCOUNTER (EMERGENCY)
Facility: HOSPITAL | Age: 34
Discharge: HOME | End: 2024-01-18
Attending: EMERGENCY MEDICINE | Admitting: EMERGENCY MEDICINE
Payer: MEDICAID

## 2024-01-18 VITALS
RESPIRATION RATE: 18 BRPM | HEIGHT: 68 IN | WEIGHT: 172.4 LBS | BODY MASS INDEX: 26.13 KG/M2 | TEMPERATURE: 98.1 F | OXYGEN SATURATION: 100 % | SYSTOLIC BLOOD PRESSURE: 131 MMHG | HEART RATE: 98 BPM | DIASTOLIC BLOOD PRESSURE: 87 MMHG

## 2024-01-18 DIAGNOSIS — L02.91 ABSCESS: Primary | ICD-10-CM

## 2024-01-18 PROCEDURE — 6370000000 HC RX 637 (ALT 250 FOR IP)

## 2024-01-18 PROCEDURE — 99283 EMERGENCY DEPT VISIT LOW MDM: CPT

## 2024-01-18 RX ORDER — KETOROLAC TROMETHAMINE 10 MG/1
10 TABLET, FILM COATED ORAL EVERY 6 HOURS PRN
Qty: 16 TABLET | Refills: 0 | Status: SHIPPED | OUTPATIENT
Start: 2024-01-18 | End: 2024-01-22

## 2024-01-18 RX ORDER — ACETAMINOPHEN 500 MG
1000 TABLET ORAL
Status: COMPLETED | OUTPATIENT
Start: 2024-01-18 | End: 2024-01-18

## 2024-01-18 RX ORDER — DOXYCYCLINE HYCLATE 100 MG
100 TABLET ORAL 2 TIMES DAILY
Qty: 14 TABLET | Refills: 0 | Status: SHIPPED | OUTPATIENT
Start: 2024-01-18 | End: 2024-01-25

## 2024-01-18 RX ORDER — HYDROCODONE BITARTRATE AND ACETAMINOPHEN 5; 325 MG/1; MG/1
1 TABLET ORAL EVERY 4 HOURS PRN
Qty: 6 TABLET | Refills: 0 | Status: SHIPPED | OUTPATIENT
Start: 2024-01-18 | End: 2024-01-21

## 2024-01-18 RX ORDER — HYDROCODONE BITARTRATE AND ACETAMINOPHEN 5; 325 MG/1; MG/1
1 TABLET ORAL
Status: COMPLETED | OUTPATIENT
Start: 2024-01-18 | End: 2024-01-18

## 2024-01-18 RX ORDER — ACETAMINOPHEN 500 MG
1000 TABLET ORAL
Qty: 60 TABLET | Refills: 0 | Status: SHIPPED | OUTPATIENT
Start: 2024-01-18 | End: 2024-02-17

## 2024-01-18 RX ADMIN — ACETAMINOPHEN 1000 MG: 500 TABLET ORAL at 08:27

## 2024-01-18 RX ADMIN — HYDROCODONE BITARTRATE AND ACETAMINOPHEN 1 TABLET: 5; 325 TABLET ORAL at 08:25

## 2024-01-18 ASSESSMENT — PAIN DESCRIPTION - PAIN TYPE: TYPE: ACUTE PAIN

## 2024-01-18 ASSESSMENT — PAIN DESCRIPTION - FREQUENCY: FREQUENCY: CONTINUOUS

## 2024-01-18 ASSESSMENT — PAIN DESCRIPTION - DESCRIPTORS
DESCRIPTORS: STABBING
DESCRIPTORS: ACHING;SHARP

## 2024-01-18 ASSESSMENT — PAIN DESCRIPTION - ONSET: ONSET: GRADUAL

## 2024-01-18 ASSESSMENT — PAIN DESCRIPTION - ORIENTATION
ORIENTATION: RIGHT
ORIENTATION: RIGHT

## 2024-01-18 ASSESSMENT — PAIN - FUNCTIONAL ASSESSMENT
PAIN_FUNCTIONAL_ASSESSMENT: ACTIVITIES ARE NOT PREVENTED
PAIN_FUNCTIONAL_ASSESSMENT: 0-10

## 2024-01-18 ASSESSMENT — PAIN DESCRIPTION - LOCATION
LOCATION: OTHER (COMMENT)
LOCATION: ARM

## 2024-01-18 ASSESSMENT — PAIN SCALES - GENERAL
PAINLEVEL_OUTOF10: 10
PAINLEVEL_OUTOF10: 10

## 2024-01-18 NOTE — DISCHARGE INSTRUCTIONS
You declined drainage in the ER today. Follow up with dermatology. Apply warm compresses frequently.     The Norco has 325mg of Tylenol in it. You may take more Tylenol in addition to the Norco, as long as you don't exceed the maximum dose of 1000mg of Tylenol every 8 hours, or 3000mg per day. You may also take Ibuprofen.

## 2024-01-18 NOTE — ED PROVIDER NOTES
pertinent family history.       SOCIAL HISTORY       Social History     Socioeconomic History    Marital status: Single     Spouse name: None    Number of children: None    Years of education: None    Highest education level: None   Tobacco Use    Smoking status: Every Day     Current packs/day: 0.25     Types: Cigarettes    Smokeless tobacco: Never   Substance and Sexual Activity    Alcohol use: Not Currently    Drug use: Not Currently     Types: Marijuana (Weed)           PHYSICAL EXAM    (up to 7 for level 4, 8 or more for level 5)     ED Triage Vitals [01/18/24 0626]   BP Temp Temp src Pulse Respirations SpO2 Height Weight - Scale   131/87 98.1 °F (36.7 °C) -- 98 16 100 % 1.727 m (5' 8\") 78.2 kg (172 lb 6.4 oz)       Body mass index is 26.21 kg/m².    Physical Exam  Vitals and nursing note reviewed.   Constitutional:       Appearance: Normal appearance.   HENT:      Head: Normocephalic and atraumatic.      Right Ear: External ear normal.      Left Ear: External ear normal.      Nose: Nose normal.      Mouth/Throat:      Mouth: Mucous membranes are moist.      Pharynx: Oropharynx is clear.   Eyes:      Extraocular Movements: Extraocular movements intact.      Conjunctiva/sclera: Conjunctivae normal.      Pupils: Pupils are equal, round, and reactive to light.   Cardiovascular:      Rate and Rhythm: Normal rate and regular rhythm.   Pulmonary:      Effort: Pulmonary effort is normal.      Breath sounds: Normal breath sounds.   Abdominal:      General: Abdomen is flat.      Palpations: Abdomen is soft.   Musculoskeletal:         General: Normal range of motion.      Cervical back: Normal range of motion.   Skin:     General: Skin is warm and dry.      Comments: 1 to 2 cm area of fluctuance in the right axilla.  Significantly tender to palpation.  No drainage or bleeding.   Neurological:      General: No focal deficit present.      Mental Status: She is alert and oriented to person, place, and time.   Psychiatric:

## 2024-01-18 NOTE — ED TRIAGE NOTES
Patient arrives for abscess under right ARM. Patient a hx of this and was drained one time before. Noticed symptoms two days. Denies any drainage at this time.

## 2024-05-14 ENCOUNTER — HOSPITAL ENCOUNTER (EMERGENCY)
Facility: HOSPITAL | Age: 34
Discharge: HOME | End: 2024-05-14
Attending: EMERGENCY MEDICINE
Payer: MEDICAID

## 2024-05-14 VITALS
DIASTOLIC BLOOD PRESSURE: 91 MMHG | BODY MASS INDEX: 23.9 KG/M2 | HEART RATE: 85 BPM | WEIGHT: 161.38 LBS | HEIGHT: 69 IN | TEMPERATURE: 97.6 F | OXYGEN SATURATION: 97 % | SYSTOLIC BLOOD PRESSURE: 149 MMHG | RESPIRATION RATE: 16 BRPM

## 2024-05-14 DIAGNOSIS — J02.9 SORE THROAT: Primary | ICD-10-CM

## 2024-05-14 DIAGNOSIS — Z76.0 ENCOUNTER FOR MEDICATION REFILL: ICD-10-CM

## 2024-05-14 LAB — S PYO AG THROAT QL: NEGATIVE

## 2024-05-14 PROCEDURE — 87880 STREP A ASSAY W/OPTIC: CPT

## 2024-05-14 PROCEDURE — 99283 EMERGENCY DEPT VISIT LOW MDM: CPT

## 2024-05-14 PROCEDURE — 87070 CULTURE OTHR SPECIMN AEROBIC: CPT

## 2024-05-14 PROCEDURE — 6370000000 HC RX 637 (ALT 250 FOR IP): Performed by: EMERGENCY MEDICINE

## 2024-05-14 RX ORDER — IBUPROFEN 400 MG/1
600 TABLET ORAL
Status: COMPLETED | OUTPATIENT
Start: 2024-05-14 | End: 2024-05-14

## 2024-05-14 RX ORDER — LEVETIRACETAM 500 MG/1
500 TABLET ORAL 2 TIMES DAILY
Qty: 60 TABLET | Refills: 1 | Status: SHIPPED | OUTPATIENT
Start: 2024-05-14

## 2024-05-14 RX ADMIN — IBUPROFEN 600 MG: 400 TABLET, FILM COATED ORAL at 08:30

## 2024-05-14 ASSESSMENT — ENCOUNTER SYMPTOMS
COLOR CHANGE: 0
COUGH: 1
SHORTNESS OF BREATH: 0
ABDOMINAL PAIN: 0
SORE THROAT: 1

## 2024-05-14 ASSESSMENT — PAIN DESCRIPTION - ONSET: ONSET: SUDDEN

## 2024-05-14 ASSESSMENT — PAIN DESCRIPTION - PAIN TYPE: TYPE: ACUTE PAIN

## 2024-05-14 ASSESSMENT — PAIN DESCRIPTION - ORIENTATION: ORIENTATION: OTHER (COMMENT)

## 2024-05-14 ASSESSMENT — PAIN SCALES - GENERAL
PAINLEVEL_OUTOF10: 10
PAINLEVEL_OUTOF10: 10
PAINLEVEL_OUTOF10: 5

## 2024-05-14 ASSESSMENT — PAIN DESCRIPTION - FREQUENCY: FREQUENCY: CONTINUOUS

## 2024-05-14 ASSESSMENT — PAIN - FUNCTIONAL ASSESSMENT: PAIN_FUNCTIONAL_ASSESSMENT: PREVENTS OR INTERFERES WITH MANY ACTIVE NOT PASSIVE ACTIVITIES

## 2024-05-14 ASSESSMENT — PAIN DESCRIPTION - LOCATION: LOCATION: GENERALIZED;THROAT

## 2024-05-14 ASSESSMENT — PAIN DESCRIPTION - DESCRIPTORS: DESCRIPTORS: SORE;ACHING

## 2024-05-14 NOTE — ED TRIAGE NOTES
TRIAGE: Pt arrives with c/o generalized body aches, sore throat and cough that started a few days ago. Pt states she had a seizure a few days ago and hit her face. Hx seizures but has not been taking her keppra. Denies dizziness, cp, sob.

## 2024-05-14 NOTE — ED PROVIDER NOTES
Appearance: Normal appearance. She is normal weight. She is not ill-appearing, toxic-appearing or diaphoretic.      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      Nose: Nose normal.      Mouth/Throat:      Mouth: Mucous membranes are moist.      Pharynx: Posterior oropharyngeal erythema present.   Eyes:      Extraocular Movements: Extraocular movements intact.      Conjunctiva/sclera: Conjunctivae normal.      Pupils: Pupils are equal, round, and reactive to light.   Cardiovascular:      Rate and Rhythm: Normal rate and regular rhythm.   Pulmonary:      Effort: Pulmonary effort is normal.      Breath sounds: Normal breath sounds.   Abdominal:      General: Bowel sounds are normal.      Palpations: Abdomen is soft.   Musculoskeletal:         General: No swelling, tenderness, deformity or signs of injury. Normal range of motion.      Cervical back: Normal range of motion and neck supple. No tenderness.      Right lower leg: No edema.      Left lower leg: No edema.   Lymphadenopathy:      Cervical: No cervical adenopathy.   Skin:     General: Skin is warm.      Findings: No lesion or rash.   Neurological:      Mental Status: She is alert.         DIAGNOSTIC RESULTS     EKG: All EKG's are interpreted by the Emergency Department Physician who either signs or Co-signs this chart in the absence of a cardiologist.        RADIOLOGY:   Non-plain film images such as CT, Ultrasound and MRI are read by the radiologist. Plain radiographic images are visualized and preliminarily interpreted by the emergency physician with the below findings:        Interpretation per the Radiologist below, if available at the time of this note:    No orders to display        LABS:  Labs Reviewed   CULTURE, THROAT   CULTURE, THROAT   POC GROUP A STREP       All other labs were within normal range or not returned as of this dictation.    EMERGENCY DEPARTMENT COURSE and DIFFERENTIAL DIAGNOSIS/MDM:

## 2024-05-16 LAB
BACTERIA SPEC CULT: NORMAL
SERVICE CMNT-IMP: NORMAL

## 2024-09-16 ENCOUNTER — HOSPITAL ENCOUNTER (EMERGENCY)
Facility: HOSPITAL | Age: 34
Discharge: HOME | End: 2024-09-16
Attending: EMERGENCY MEDICINE
Payer: MEDICAID

## 2024-09-16 VITALS
RESPIRATION RATE: 16 BRPM | HEART RATE: 94 BPM | WEIGHT: 167.11 LBS | SYSTOLIC BLOOD PRESSURE: 118 MMHG | BODY MASS INDEX: 24.68 KG/M2 | DIASTOLIC BLOOD PRESSURE: 89 MMHG | TEMPERATURE: 98.7 F | OXYGEN SATURATION: 100 %

## 2024-09-16 DIAGNOSIS — N60.81 CYST OF SKIN OF RIGHT BREAST: Primary | ICD-10-CM

## 2024-09-16 PROCEDURE — 99283 EMERGENCY DEPT VISIT LOW MDM: CPT

## 2024-09-16 RX ORDER — MUPIROCIN 20 MG/G
OINTMENT TOPICAL
Qty: 1 G | Refills: 0 | Status: SHIPPED | OUTPATIENT
Start: 2024-09-16 | End: 2024-09-23

## 2024-09-16 RX ORDER — DICLOXACILLIN SODIUM 500 MG
500 CAPSULE ORAL 4 TIMES DAILY
Qty: 40 CAPSULE | Refills: 0 | Status: SHIPPED | OUTPATIENT
Start: 2024-09-16 | End: 2024-09-26

## 2024-09-16 RX ORDER — TRAMADOL HYDROCHLORIDE 50 MG/1
50 TABLET ORAL EVERY 6 HOURS PRN
Qty: 12 TABLET | Refills: 0 | Status: SHIPPED | OUTPATIENT
Start: 2024-09-16 | End: 2024-09-19

## 2024-09-16 ASSESSMENT — PAIN SCALES - GENERAL: PAINLEVEL_OUTOF10: 10

## 2024-09-16 ASSESSMENT — ENCOUNTER SYMPTOMS
COUGH: 0
ABDOMINAL PAIN: 0
TROUBLE SWALLOWING: 0
SHORTNESS OF BREATH: 0
BACK PAIN: 0

## 2024-09-16 ASSESSMENT — PAIN - FUNCTIONAL ASSESSMENT
PAIN_FUNCTIONAL_ASSESSMENT: PREVENTS OR INTERFERES SOME ACTIVE ACTIVITIES AND ADLS
PAIN_FUNCTIONAL_ASSESSMENT: 0-10

## 2024-09-16 ASSESSMENT — PAIN DESCRIPTION - ORIENTATION: ORIENTATION: RIGHT

## 2024-09-16 ASSESSMENT — PAIN DESCRIPTION - LOCATION: LOCATION: BREAST

## 2024-09-16 ASSESSMENT — PAIN DESCRIPTION - DESCRIPTORS: DESCRIPTORS: OTHER (COMMENT)

## 2025-04-02 ENCOUNTER — HOSPITAL ENCOUNTER (EMERGENCY)
Facility: HOSPITAL | Age: 35
Discharge: HOME | End: 2025-04-02
Attending: EMERGENCY MEDICINE
Payer: MEDICAID

## 2025-04-02 VITALS
BODY MASS INDEX: 27.63 KG/M2 | DIASTOLIC BLOOD PRESSURE: 83 MMHG | HEIGHT: 68 IN | RESPIRATION RATE: 18 BRPM | WEIGHT: 182.32 LBS | TEMPERATURE: 97.7 F | SYSTOLIC BLOOD PRESSURE: 100 MMHG | HEART RATE: 68 BPM | OXYGEN SATURATION: 98 %

## 2025-04-02 DIAGNOSIS — N64.4 BREAST PAIN IN FEMALE: ICD-10-CM

## 2025-04-02 DIAGNOSIS — R11.2 NAUSEA AND VOMITING, UNSPECIFIED VOMITING TYPE: Primary | ICD-10-CM

## 2025-04-02 DIAGNOSIS — G40.909 NONINTRACTABLE EPILEPSY WITHOUT STATUS EPILEPTICUS, UNSPECIFIED EPILEPSY TYPE (HCC): ICD-10-CM

## 2025-04-02 LAB
ALBUMIN SERPL-MCNC: 3.8 G/DL (ref 3.5–5)
ALBUMIN/GLOB SERPL: 0.9 (ref 1.1–2.2)
ALP SERPL-CCNC: 72 U/L (ref 45–117)
ALT SERPL-CCNC: 21 U/L (ref 12–78)
ANION GAP SERPL CALC-SCNC: 6 MMOL/L (ref 2–12)
AST SERPL-CCNC: 20 U/L (ref 15–37)
BASOPHILS # BLD: 0.05 K/UL (ref 0–0.1)
BASOPHILS NFR BLD: 1 % (ref 0–1)
BILIRUB SERPL-MCNC: 0.6 MG/DL (ref 0.2–1)
BUN SERPL-MCNC: 10 MG/DL (ref 6–20)
BUN/CREAT SERPL: 14 (ref 12–20)
CALCIUM SERPL-MCNC: 8.8 MG/DL (ref 8.5–10.1)
CHLORIDE SERPL-SCNC: 104 MMOL/L (ref 97–108)
CO2 SERPL-SCNC: 24 MMOL/L (ref 21–32)
COMMENT:: NORMAL
CREAT SERPL-MCNC: 0.7 MG/DL (ref 0.55–1.02)
DIFFERENTIAL METHOD BLD: NORMAL
EOSINOPHIL # BLD: 0.19 K/UL (ref 0–0.4)
EOSINOPHIL NFR BLD: 3.8 % (ref 0–7)
ERYTHROCYTE [DISTWIDTH] IN BLOOD BY AUTOMATED COUNT: 12.9 % (ref 11.5–14.5)
GLOBULIN SER CALC-MCNC: 4.3 G/DL (ref 2–4)
GLUCOSE SERPL-MCNC: 78 MG/DL (ref 65–100)
HCG SERPL QL: NEGATIVE
HCT VFR BLD AUTO: 41.9 % (ref 35–47)
HGB BLD-MCNC: 14.4 G/DL (ref 11.5–16)
IMM GRANULOCYTES # BLD AUTO: 0.02 K/UL (ref 0–0.04)
IMM GRANULOCYTES NFR BLD AUTO: 0.4 % (ref 0–0.5)
LYMPHOCYTES # BLD: 1.34 K/UL (ref 0.8–3.5)
LYMPHOCYTES NFR BLD: 27 % (ref 12–49)
MAGNESIUM SERPL-MCNC: 2.3 MG/DL (ref 1.6–2.4)
MCH RBC QN AUTO: 31.3 PG (ref 26–34)
MCHC RBC AUTO-ENTMCNC: 34.4 G/DL (ref 30–36.5)
MCV RBC AUTO: 91.1 FL (ref 80–99)
MONOCYTES # BLD: 0.44 K/UL (ref 0–1)
MONOCYTES NFR BLD: 8.9 % (ref 5–13)
NEUTS SEG # BLD: 2.92 K/UL (ref 1.8–8)
NEUTS SEG NFR BLD: 58.9 % (ref 32–75)
NRBC # BLD: 0 K/UL (ref 0–0.01)
NRBC BLD-RTO: 0 PER 100 WBC
PLATELET # BLD AUTO: 321 K/UL (ref 150–400)
PMV BLD AUTO: 9.6 FL (ref 8.9–12.9)
POTASSIUM SERPL-SCNC: 3.8 MMOL/L (ref 3.5–5.1)
PROT SERPL-MCNC: 8.1 G/DL (ref 6.4–8.2)
RBC # BLD AUTO: 4.6 M/UL (ref 3.8–5.2)
SODIUM SERPL-SCNC: 134 MMOL/L (ref 136–145)
SPECIMEN HOLD: NORMAL
WBC # BLD AUTO: 5 K/UL (ref 3.6–11)

## 2025-04-02 PROCEDURE — 6360000002 HC RX W HCPCS: Performed by: PHYSICIAN ASSISTANT

## 2025-04-02 PROCEDURE — 83735 ASSAY OF MAGNESIUM: CPT

## 2025-04-02 PROCEDURE — 84703 CHORIONIC GONADOTROPIN ASSAY: CPT

## 2025-04-02 PROCEDURE — 80053 COMPREHEN METABOLIC PANEL: CPT

## 2025-04-02 PROCEDURE — 96375 TX/PRO/DX INJ NEW DRUG ADDON: CPT

## 2025-04-02 PROCEDURE — 99284 EMERGENCY DEPT VISIT MOD MDM: CPT

## 2025-04-02 PROCEDURE — 85025 COMPLETE CBC W/AUTO DIFF WBC: CPT

## 2025-04-02 PROCEDURE — 96374 THER/PROPH/DIAG INJ IV PUSH: CPT

## 2025-04-02 PROCEDURE — 2580000003 HC RX 258: Performed by: PHYSICIAN ASSISTANT

## 2025-04-02 RX ORDER — LEVETIRACETAM 500 MG/5ML
500 INJECTION, SOLUTION, CONCENTRATE INTRAVENOUS
Status: COMPLETED | OUTPATIENT
Start: 2025-04-02 | End: 2025-04-02

## 2025-04-02 RX ORDER — ONDANSETRON 4 MG/1
4 TABLET, FILM COATED ORAL 3 TIMES DAILY PRN
Qty: 15 TABLET | Refills: 0 | Status: SHIPPED | OUTPATIENT
Start: 2025-04-02

## 2025-04-02 RX ORDER — ONDANSETRON 2 MG/ML
4 INJECTION INTRAMUSCULAR; INTRAVENOUS
Status: COMPLETED | OUTPATIENT
Start: 2025-04-02 | End: 2025-04-02

## 2025-04-02 RX ORDER — KETOROLAC TROMETHAMINE 30 MG/ML
15 INJECTION, SOLUTION INTRAMUSCULAR; INTRAVENOUS
Status: COMPLETED | OUTPATIENT
Start: 2025-04-02 | End: 2025-04-02

## 2025-04-02 RX ORDER — LEVETIRACETAM 500 MG/1
500 TABLET ORAL 2 TIMES DAILY
Qty: 60 TABLET | Refills: 3 | Status: SHIPPED | OUTPATIENT
Start: 2025-04-02 | End: 2025-04-02

## 2025-04-02 RX ORDER — ONDANSETRON 4 MG/1
4 TABLET, FILM COATED ORAL 3 TIMES DAILY PRN
Qty: 15 TABLET | Refills: 0 | Status: SHIPPED | OUTPATIENT
Start: 2025-04-02 | End: 2025-04-02

## 2025-04-02 RX ORDER — LEVETIRACETAM 500 MG/1
500 TABLET ORAL
Status: DISCONTINUED | OUTPATIENT
Start: 2025-04-02 | End: 2025-04-02

## 2025-04-02 RX ORDER — LEVETIRACETAM 500 MG/1
500 TABLET ORAL 2 TIMES DAILY
Qty: 60 TABLET | Refills: 3 | Status: SHIPPED | OUTPATIENT
Start: 2025-04-02

## 2025-04-02 RX ORDER — SODIUM CHLORIDE, SODIUM LACTATE, POTASSIUM CHLORIDE, AND CALCIUM CHLORIDE .6; .31; .03; .02 G/100ML; G/100ML; G/100ML; G/100ML
1000 INJECTION, SOLUTION INTRAVENOUS ONCE
Status: COMPLETED | OUTPATIENT
Start: 2025-04-02 | End: 2025-04-02

## 2025-04-02 RX ADMIN — ONDANSETRON 4 MG: 2 INJECTION, SOLUTION INTRAMUSCULAR; INTRAVENOUS at 10:19

## 2025-04-02 RX ADMIN — SODIUM CHLORIDE, SODIUM LACTATE, POTASSIUM CHLORIDE, AND CALCIUM CHLORIDE 1000 ML: .6; .31; .03; .02 INJECTION, SOLUTION INTRAVENOUS at 09:37

## 2025-04-02 RX ADMIN — KETOROLAC TROMETHAMINE 15 MG: 30 INJECTION, SOLUTION INTRAMUSCULAR; INTRAVENOUS at 10:19

## 2025-04-02 RX ADMIN — LEVETIRACETAM 500 MG: 100 INJECTION INTRAVENOUS at 09:39

## 2025-04-02 ASSESSMENT — PAIN SCALES - GENERAL
PAINLEVEL_OUTOF10: 7
PAINLEVEL_OUTOF10: 6

## 2025-04-02 ASSESSMENT — PAIN DESCRIPTION - DESCRIPTORS
DESCRIPTORS: ACHING
DESCRIPTORS: ACHING

## 2025-04-02 ASSESSMENT — PAIN DESCRIPTION - ORIENTATION
ORIENTATION: MID
ORIENTATION: MID

## 2025-04-02 ASSESSMENT — PAIN DESCRIPTION - PAIN TYPE: TYPE: ACUTE PAIN

## 2025-04-02 ASSESSMENT — PAIN DESCRIPTION - ONSET: ONSET: ON-GOING

## 2025-04-02 ASSESSMENT — PAIN DESCRIPTION - LOCATION
LOCATION: ABDOMEN
LOCATION: ABDOMEN

## 2025-04-02 ASSESSMENT — PAIN - FUNCTIONAL ASSESSMENT
PAIN_FUNCTIONAL_ASSESSMENT: ACTIVITIES ARE NOT PREVENTED
PAIN_FUNCTIONAL_ASSESSMENT: PREVENTS OR INTERFERES SOME ACTIVE ACTIVITIES AND ADLS
PAIN_FUNCTIONAL_ASSESSMENT: 0-10

## 2025-04-02 ASSESSMENT — PAIN DESCRIPTION - FREQUENCY: FREQUENCY: CONTINUOUS

## 2025-04-02 NOTE — ED PROVIDER NOTES
[facility] EMERGENCY DEPARTMENT  EMERGENCY DEPARTMENT ENCOUNTER      Pt Name: Faye Montalvo  MRN: [id number]  Birthdate 1990  Date of evaluation: 4/2/2025  Provider: Scooter Richardson PA-C    CHIEF COMPLAINT       Chief Complaint   Patient presents with    Abdominal Pain         HISTORY OF PRESENT ILLNESS   (Location/Symptom, Timing/Onset, Context/Setting, Quality, Duration, Modifying Factors, Severity)  Note limiting factors.   Patient presents complaining of nonbloody vomiting and diarrhea that started last night.  She has minimal abdominal pain that only occurs with the vomiting.  She also notes that she has bodyaches.     She notes that she has bilateral breast soreness that is been ongoing for some time and that she follows with her OB/GYN for recurrent breast cysts/abscesses.  She has had some redness and drainage from her breast and thinks that the abscess could be returning.  She says she has an appointment with her OB/GYN in May.  She also reports bilateral axilla pain and notes that she has hidradenitis suppurativa.    She also notes that her  witnessed her having some shaking in her sleep a few nights ago.  She reports that she has a history of epilepsy with absence seizure's and if she has not been on Keppra for about 3 to 6 months.  She says she has not seen a neurologist in at least a year.  She last got her Keppra prescription from the emergency room at [facility].  She says she has not had a seizure for 1 to 2 years.    She otherwise denies fever/chills.            Review of External Medical Records:     Nursing Notes were reviewed.    REVIEW OF SYSTEMS    (2-9 systems for level 4, 10 or more for level 5)     Review of Systems    Except as noted above the remainder of the review of systems was reviewed and negative.       PAST MEDICAL HISTORY     Past Medical History:   Diagnosis Date    Abnormal Papanicolaou smear of cervix 2021    HPV+' biopsy done    Anemia     Epilepsy

## 2025-04-02 NOTE — DISCHARGE INSTRUCTIONS
As discussed, please follow-up with your OB/GYN and neurology. You should also establish care with a primary care provider. See clinics below for a variety of clinics.    [facility] Health Departments     For adult and child immunizations, family planning, TB screening, STD testing and women's health services.   [facility]: [facility] [phone]      [facility] [phone]    [facility]   [phone]    [facility]   [phone]    [place]   [phone]    [facility] [place] [phone]      [place] [phone]      [place] [phone]          General Health Clinics     For primary care services, woman and child wellness, and some clinics providing specialty care.   [facility] [address] [phone]   [facility] [address] [phone]   [facility] [address] [phone]    [address] [phone]   [facility] [address] [phone]   [facility] [address] [phone]   [place] [facility] [address] [phone]   [facility] [address] [phone], [phone]     [place] [address] [phone]     [place] [address] [facility] [phone]   [facility] [address] [phone]   [facility]  [address] [phone]   [facility] ([email or web address]) [phone]         Sexual Health/Woman Wellness Clinics    For STD/HIV testing and treatment, pregnancy testing and services, men's health, birth control services, LGBT services, and hepatitis/HPV vaccine services.   [facility] [address] [phone]   [facility] [address]  [phone]   [facility] [address] [phone]   [facility] [address], 5th floor

## 2025-04-02 NOTE — ED TRIAGE NOTES
Vomiting all night with diarrhea. Joint and muscle pain since last week. She also reports her right breast is sore and she has a history of abscess there.

## 2025-07-25 ENCOUNTER — APPOINTMENT (OUTPATIENT)
Facility: HOSPITAL | Age: 35
End: 2025-07-25
Payer: MEDICAID

## 2025-07-25 ENCOUNTER — HOSPITAL ENCOUNTER (EMERGENCY)
Facility: HOSPITAL | Age: 35
Discharge: HOME | End: 2025-07-25
Attending: STUDENT IN AN ORGANIZED HEALTH CARE EDUCATION/TRAINING PROGRAM
Payer: MEDICAID

## 2025-07-25 VITALS
TEMPERATURE: 98.3 F | BODY MASS INDEX: 25.09 KG/M2 | WEIGHT: 165.57 LBS | SYSTOLIC BLOOD PRESSURE: 127 MMHG | HEIGHT: 68 IN | DIASTOLIC BLOOD PRESSURE: 92 MMHG | HEART RATE: 78 BPM | OXYGEN SATURATION: 100 % | RESPIRATION RATE: 13 BRPM

## 2025-07-25 DIAGNOSIS — R07.89 ATYPICAL CHEST PAIN: Primary | ICD-10-CM

## 2025-07-25 LAB
ALBUMIN SERPL-MCNC: 4.2 G/DL (ref 3.5–5)
ALBUMIN/GLOB SERPL: 0.9 (ref 1.1–2.2)
ALP SERPL-CCNC: 82 U/L (ref 45–117)
ALT SERPL-CCNC: 30 U/L (ref 12–78)
ANION GAP SERPL CALC-SCNC: 6 MMOL/L (ref 2–12)
AST SERPL-CCNC: 17 U/L (ref 15–37)
BASOPHILS # BLD: 0.06 K/UL (ref 0–0.1)
BASOPHILS NFR BLD: 1.4 % (ref 0–1)
BILIRUB SERPL-MCNC: 0.5 MG/DL (ref 0.2–1)
BUN SERPL-MCNC: 14 MG/DL (ref 6–20)
BUN/CREAT SERPL: 18 (ref 12–20)
CALCIUM SERPL-MCNC: 9.7 MG/DL (ref 8.5–10.1)
CHLORIDE SERPL-SCNC: 106 MMOL/L (ref 97–108)
CO2 SERPL-SCNC: 26 MMOL/L (ref 21–32)
COMMENT:: NORMAL
CREAT SERPL-MCNC: 0.79 MG/DL (ref 0.55–1.02)
DIFFERENTIAL METHOD BLD: ABNORMAL
EOSINOPHIL # BLD: 0.12 K/UL (ref 0–0.4)
EOSINOPHIL NFR BLD: 2.9 % (ref 0–7)
ERYTHROCYTE [DISTWIDTH] IN BLOOD BY AUTOMATED COUNT: 11.8 % (ref 11.5–14.5)
GLOBULIN SER CALC-MCNC: 4.5 G/DL (ref 2–4)
GLUCOSE SERPL-MCNC: 91 MG/DL (ref 65–100)
HCT VFR BLD AUTO: 46.1 % (ref 35–47)
HGB BLD-MCNC: 15.3 G/DL (ref 11.5–16)
IMM GRANULOCYTES # BLD AUTO: 0.01 K/UL (ref 0–0.04)
IMM GRANULOCYTES NFR BLD AUTO: 0.2 % (ref 0–0.5)
LYMPHOCYTES # BLD: 1.63 K/UL (ref 0.8–3.5)
LYMPHOCYTES NFR BLD: 39.3 % (ref 12–49)
MCH RBC QN AUTO: 31.9 PG (ref 26–34)
MCHC RBC AUTO-ENTMCNC: 33.2 G/DL (ref 30–36.5)
MCV RBC AUTO: 96.2 FL (ref 80–99)
MONOCYTES # BLD: 0.46 K/UL (ref 0–1)
MONOCYTES NFR BLD: 11.1 % (ref 5–13)
NEUTS SEG # BLD: 1.87 K/UL (ref 1.8–8)
NEUTS SEG NFR BLD: 45.1 % (ref 32–75)
NRBC # BLD: 0 K/UL (ref 0–0.01)
NRBC BLD-RTO: 0 PER 100 WBC
PLATELET # BLD AUTO: 365 K/UL (ref 150–400)
PMV BLD AUTO: 9.5 FL (ref 8.9–12.9)
POTASSIUM SERPL-SCNC: 3.8 MMOL/L (ref 3.5–5.1)
PROT SERPL-MCNC: 8.7 G/DL (ref 6.4–8.2)
RBC # BLD AUTO: 4.79 M/UL (ref 3.8–5.2)
SODIUM SERPL-SCNC: 138 MMOL/L (ref 136–145)
SPECIMEN HOLD: NORMAL
TROPONIN I SERPL HS-MCNC: 4 NG/L (ref 0–51)
WBC # BLD AUTO: 4.2 K/UL (ref 3.6–11)

## 2025-07-25 PROCEDURE — 6360000002 HC RX W HCPCS: Performed by: STUDENT IN AN ORGANIZED HEALTH CARE EDUCATION/TRAINING PROGRAM

## 2025-07-25 PROCEDURE — 6370000000 HC RX 637 (ALT 250 FOR IP): Performed by: STUDENT IN AN ORGANIZED HEALTH CARE EDUCATION/TRAINING PROGRAM

## 2025-07-25 PROCEDURE — 85025 COMPLETE CBC W/AUTO DIFF WBC: CPT

## 2025-07-25 PROCEDURE — 93005 ELECTROCARDIOGRAM TRACING: CPT | Performed by: STUDENT IN AN ORGANIZED HEALTH CARE EDUCATION/TRAINING PROGRAM

## 2025-07-25 PROCEDURE — 80053 COMPREHEN METABOLIC PANEL: CPT

## 2025-07-25 PROCEDURE — 71046 X-RAY EXAM CHEST 2 VIEWS: CPT

## 2025-07-25 PROCEDURE — 84484 ASSAY OF TROPONIN QUANT: CPT

## 2025-07-25 PROCEDURE — 96374 THER/PROPH/DIAG INJ IV PUSH: CPT

## 2025-07-25 PROCEDURE — 99285 EMERGENCY DEPT VISIT HI MDM: CPT

## 2025-07-25 RX ORDER — KETOROLAC TROMETHAMINE 30 MG/ML
30 INJECTION, SOLUTION INTRAMUSCULAR; INTRAVENOUS
Status: COMPLETED | OUTPATIENT
Start: 2025-07-25 | End: 2025-07-25

## 2025-07-25 RX ORDER — METHOCARBAMOL 750 MG/1
750 TABLET, FILM COATED ORAL ONCE
Status: COMPLETED | OUTPATIENT
Start: 2025-07-25 | End: 2025-07-25

## 2025-07-25 RX ORDER — LIDOCAINE 50 MG/G
1 PATCH TOPICAL DAILY
Qty: 10 PATCH | Refills: 0 | Status: SHIPPED | OUTPATIENT
Start: 2025-07-25 | End: 2025-08-04

## 2025-07-25 RX ORDER — METHOCARBAMOL 750 MG/1
750 TABLET, FILM COATED ORAL 4 TIMES DAILY
Qty: 20 TABLET | Refills: 0 | Status: SHIPPED | OUTPATIENT
Start: 2025-07-25 | End: 2025-07-30

## 2025-07-25 RX ORDER — NAPROXEN 500 MG/1
500 TABLET ORAL 2 TIMES DAILY
Qty: 10 TABLET | Refills: 0 | Status: SHIPPED | OUTPATIENT
Start: 2025-07-25 | End: 2025-07-30

## 2025-07-25 RX ADMIN — METHOCARBAMOL 750 MG: 750 TABLET ORAL at 07:41

## 2025-07-25 RX ADMIN — KETOROLAC TROMETHAMINE 30 MG: 30 INJECTION, SOLUTION INTRAMUSCULAR; INTRAVENOUS at 07:41

## 2025-07-25 ASSESSMENT — PAIN DESCRIPTION - LOCATION
LOCATION: CHEST

## 2025-07-25 ASSESSMENT — PAIN DESCRIPTION - PAIN TYPE: TYPE: ACUTE PAIN

## 2025-07-25 ASSESSMENT — PAIN DESCRIPTION - ORIENTATION
ORIENTATION: MID
ORIENTATION: MID

## 2025-07-25 ASSESSMENT — PAIN - FUNCTIONAL ASSESSMENT
PAIN_FUNCTIONAL_ASSESSMENT: ACTIVITIES ARE NOT PREVENTED
PAIN_FUNCTIONAL_ASSESSMENT: ACTIVITIES ARE NOT PREVENTED
PAIN_FUNCTIONAL_ASSESSMENT: PREVENTS OR INTERFERES SOME ACTIVE ACTIVITIES AND ADLS
PAIN_FUNCTIONAL_ASSESSMENT: 0-10

## 2025-07-25 ASSESSMENT — PAIN DESCRIPTION - DIRECTION: RADIATING_TOWARDS: LEFT ARM

## 2025-07-25 ASSESSMENT — PAIN DESCRIPTION - DESCRIPTORS
DESCRIPTORS: DISCOMFORT
DESCRIPTORS: PRESSURE

## 2025-07-25 ASSESSMENT — PAIN SCALES - GENERAL
PAINLEVEL_OUTOF10: 7
PAINLEVEL_OUTOF10: 7
PAINLEVEL_OUTOF10: 8

## 2025-07-25 ASSESSMENT — PAIN DESCRIPTION - FREQUENCY: FREQUENCY: CONTINUOUS

## 2025-07-25 ASSESSMENT — LIFESTYLE VARIABLES
HOW OFTEN DO YOU HAVE A DRINK CONTAINING ALCOHOL: MONTHLY OR LESS
HOW MANY STANDARD DRINKS CONTAINING ALCOHOL DO YOU HAVE ON A TYPICAL DAY: 1 OR 2

## 2025-07-25 ASSESSMENT — PAIN DESCRIPTION - ONSET: ONSET: ON-GOING

## 2025-07-25 NOTE — ED TRIAGE NOTES
Patient ambulated to triage with cc CP radiating to left arm with numbness and tingling. Endorses SOB and nausea

## 2025-07-25 NOTE — ED PROVIDER NOTES
[facility] EMERGENCY DEPARTMENT  EMERGENCY DEPARTMENT ENCOUNTER      Pt Name: Faye Montalvo  MRN: [id number]  Birthdate 1990  Date of evaluation: 7/25/2025  Provider: Nitin Person DO    CHIEF COMPLAINT       Chief Complaint   Patient presents with    Chest Pain         HISTORY OF PRESENT ILLNESS   (Location/Symptom, Timing/Onset, Context/Setting, Quality, Duration, Modifying Factors, Severity)  Note limiting factors.   Patient is a 35-year-old female history of epilepsy treated with Keppra here today secondary to chest pain.  She woke up around 4:30 AM with left-sided chest pain that is in the shoulder, left upper back and down left arm with associated tingling in the left arm.  It is worse with palpation of the chest wall and flexion of the torso.  No shortness of breath.  No history of DVT/PE.  No estrogen use, tobacco use, recent immobility, leg pain or swelling.  She denies cough, fever.  No cardiac or pulmonary history.  Took nothing for the pain prior to arrival.            Review of External Medical Records:     Nursing Notes were reviewed.    REVIEW OF SYSTEMS    (2-9 systems for level 4, 10 or more for level 5)     Review of Systems   Cardiovascular:  Positive for chest pain.       Except as noted above the remainder of the review of systems was reviewed and negative.       PAST MEDICAL HISTORY     Past Medical History:   Diagnosis Date    Abnormal Papanicolaou smear of cervix 2021    HPV+' biopsy done    Anemia     Epilepsy (HCC)     last seizure a year ago-not taking meds    Psychiatric problem     anxiety-no meds         SURGICAL HISTORY     No past surgical history on file.      CURRENT MEDICATIONS       Previous Medications    ACETAMINOPHEN (TYLENOL) 500 MG TABLET    Take 2 tablets by mouth every 6-8 hours as needed for Pain    FERROUS SULFATE (IRON 325) 325 (65 FE) MG TABLET    Take 325 mg by mouth every morning (before breakfast)    KETOROLAC (TORADOL) 10 MG TABLET    Take 1

## 2025-07-26 LAB
EKG ATRIAL RATE: 86 BPM
EKG DIAGNOSIS: NORMAL
EKG P AXIS: 55 DEGREES
EKG P-R INTERVAL: 150 MS
EKG Q-T INTERVAL: 388 MS
EKG QRS DURATION: 86 MS
EKG QTC CALCULATION (BAZETT): 464 MS
EKG R AXIS: 73 DEGREES
EKG T AXIS: 15 DEGREES
EKG VENTRICULAR RATE: 86 BPM

## 2025-07-26 PROCEDURE — 93010 ELECTROCARDIOGRAM REPORT: CPT | Performed by: SPECIALIST
